# Patient Record
Sex: MALE | Race: WHITE | Employment: UNEMPLOYED | URBAN - METROPOLITAN AREA
[De-identification: names, ages, dates, MRNs, and addresses within clinical notes are randomized per-mention and may not be internally consistent; named-entity substitution may affect disease eponyms.]

---

## 2017-02-20 ENCOUNTER — OFFICE VISIT (OUTPATIENT)
Dept: FAMILY MEDICINE CLINIC | Facility: CLINIC | Age: 42
End: 2017-02-20

## 2017-02-20 VITALS
TEMPERATURE: 98 F | SYSTOLIC BLOOD PRESSURE: 154 MMHG | BODY MASS INDEX: 28.25 KG/M2 | DIASTOLIC BLOOD PRESSURE: 100 MMHG | WEIGHT: 180 LBS | HEIGHT: 67 IN | OXYGEN SATURATION: 98 % | RESPIRATION RATE: 18 BRPM | HEART RATE: 90 BPM

## 2017-02-20 DIAGNOSIS — H66.001 RIGHT ACUTE SUPPURATIVE OTITIS MEDIA: Primary | ICD-10-CM

## 2017-02-20 DIAGNOSIS — H60.311 ACUTE DIFFUSE OTITIS EXTERNA OF RIGHT EAR: ICD-10-CM

## 2017-02-20 PROCEDURE — 99213 OFFICE O/P EST LOW 20 MIN: CPT | Performed by: NURSE PRACTITIONER

## 2017-02-20 RX ORDER — AZITHROMYCIN 250 MG/1
TABLET, FILM COATED ORAL
Qty: 6 TABLET | Refills: 0 | Status: SHIPPED | OUTPATIENT
Start: 2017-02-20 | End: 2018-03-20 | Stop reason: ALTCHOICE

## 2017-02-20 RX ORDER — CIPROFLOXACIN AND DEXAMETHASONE 3; 1 MG/ML; MG/ML
4 SUSPENSION/ DROPS AURICULAR (OTIC) 2 TIMES DAILY
Qty: 1 BOTTLE | Refills: 0 | Status: SHIPPED | OUTPATIENT
Start: 2017-02-20 | End: 2018-03-20 | Stop reason: ALTCHOICE

## 2017-02-20 NOTE — PATIENT INSTRUCTIONS
Otitis Externa   · Don't use Q-tips. Keep ear dry. Wear cotton ball in ear during shower. No swimming or head submersion for 7 days and infection cleared. · Use antibiotic drops x 7 days. You should feel better in 2 days.  Use drops x 7 days or infecti 9. You may use over-the-counter medicine, such as acetaminophen or ibuprofen, to control pain and fever, unless something else was prescribed.  If you have chronic liver or kidney disease or have ever had a stomach ulcer or gastrointestinal bleeding, talk w 11. Use prescribed ear drops as directed. These help reduce swelling and fight the infection. If an ear wick was placed in the ear canal, apply drops right onto the end of the wick.  The wick will draw the medication into the ear canal even if it is swollen

## 2017-02-20 NOTE — PROGRESS NOTES
CHIEF COMPLAINT:   Patient presents with:  Ear Pain: Right ear pain, drainage, swollen glad x 1 month       HPI:   Kvng Mack is a 42year old male who presents to clinic today with complaints of right ear pain. Has had for 1 month.  Pt reports worse EARS: Bilateral hearing is intact to conversation, whisper, and finger rub.  right tragus mildly tender on palpation; left tragus non tender on palpation. right external auditory canal with erythema, purulent drainage, and mild swelling.   left external aud Sig: Place 4 drops into the right ear 2 (two) times daily. Risks, benefits, and side effects of medication explained and discussed. Risk and benefits of medication discussed.  Stressed importance of completing full course of antibiotic ear dudley 8. Finish all of the antibiotic medicine given, even though you may feel better after the first few days. 9. You may use over-the-counter medicine, such as acetaminophen or ibuprofen, to control pain and fever, unless something else was prescribed.  If you 10. Do not try to clean the ear canal. This can push pus and bacteria deeper into the canal.  11. Use prescribed ear drops as directed. These help reduce swelling and fight the infection.  If an ear wick was placed in the ear canal, apply drops right onto t © 8698-6163 [address], [address] [place]. All rights reserved. This information is not intended as a substitute for professional medical care. Always follow your healthcare professional's instructions.               P

## 2018-03-20 ENCOUNTER — APPOINTMENT (OUTPATIENT)
Dept: GENERAL RADIOLOGY | Facility: HOSPITAL | Age: 43
End: 2018-03-20
Attending: EMERGENCY MEDICINE
Payer: COMMERCIAL

## 2018-03-20 ENCOUNTER — OFFICE VISIT (OUTPATIENT)
Dept: INTERNAL MEDICINE CLINIC | Facility: CLINIC | Age: 43
End: 2018-03-20

## 2018-03-20 ENCOUNTER — HOSPITAL ENCOUNTER (EMERGENCY)
Facility: HOSPITAL | Age: 43
Discharge: HOME | End: 2018-03-20
Attending: EMERGENCY MEDICINE
Payer: COMMERCIAL

## 2018-03-20 ENCOUNTER — APPOINTMENT (OUTPATIENT)
Dept: CT IMAGING | Facility: HOSPITAL | Age: 43
End: 2018-03-20
Attending: EMERGENCY MEDICINE
Payer: COMMERCIAL

## 2018-03-20 VITALS
HEIGHT: 69 IN | WEIGHT: 191 LBS | BODY MASS INDEX: 28.29 KG/M2 | RESPIRATION RATE: 16 BRPM | HEART RATE: 93 BPM | TEMPERATURE: 98 F | SYSTOLIC BLOOD PRESSURE: 192 MMHG | DIASTOLIC BLOOD PRESSURE: 113 MMHG | OXYGEN SATURATION: 98 %

## 2018-03-20 VITALS
WEIGHT: 191 LBS | RESPIRATION RATE: 16 BRPM | HEART RATE: 79 BPM | HEIGHT: 67 IN | OXYGEN SATURATION: 98 % | SYSTOLIC BLOOD PRESSURE: 210 MMHG | TEMPERATURE: 99 F | BODY MASS INDEX: 29.98 KG/M2 | DIASTOLIC BLOOD PRESSURE: 120 MMHG

## 2018-03-20 DIAGNOSIS — I16.0 HYPERTENSIVE URGENCY: Primary | ICD-10-CM

## 2018-03-20 DIAGNOSIS — M54.2 CHRONIC NECK PAIN: ICD-10-CM

## 2018-03-20 DIAGNOSIS — E66.3 OVERWEIGHT (BMI 25.0-29.9): ICD-10-CM

## 2018-03-20 DIAGNOSIS — G89.29 CHRONIC NECK PAIN: ICD-10-CM

## 2018-03-20 DIAGNOSIS — I16.1 HYPERTENSIVE EMERGENCY: Primary | ICD-10-CM

## 2018-03-20 DIAGNOSIS — H53.9 VISUAL CHANGES: ICD-10-CM

## 2018-03-20 LAB
ALBUMIN SERPL-MCNC: 4.1 G/DL (ref 3.5–4.8)
ALP LIVER SERPL-CCNC: 40 U/L (ref 45–117)
ALT SERPL-CCNC: 30 U/L (ref 17–63)
AST SERPL-CCNC: 24 U/L (ref 15–41)
ATRIAL RATE: 83 BPM
BASOPHILS # BLD AUTO: 0.06 X10(3) UL (ref 0–0.1)
BASOPHILS NFR BLD AUTO: 0.7 %
BILIRUB SERPL-MCNC: 0.3 MG/DL (ref 0.1–2)
BUN BLD-MCNC: 18 MG/DL (ref 8–20)
CALCIUM BLD-MCNC: 9.1 MG/DL (ref 8.3–10.3)
CHLORIDE: 107 MMOL/L (ref 101–111)
CO2: 30 MMOL/L (ref 22–32)
CREAT BLD-MCNC: 1.06 MG/DL (ref 0.7–1.3)
EOSINOPHIL # BLD AUTO: 0.27 X10(3) UL (ref 0–0.3)
EOSINOPHIL NFR BLD AUTO: 3.2 %
ERYTHROCYTE [DISTWIDTH] IN BLOOD BY AUTOMATED COUNT: 12.8 % (ref 11.5–16)
GLUCOSE BLD-MCNC: 98 MG/DL (ref 70–99)
HCT VFR BLD AUTO: 43.1 % (ref 37–53)
HGB BLD-MCNC: 15.5 G/DL (ref 13–17)
IMMATURE GRANULOCYTE COUNT: 0.04 X10(3) UL (ref 0–1)
IMMATURE GRANULOCYTE RATIO %: 0.5 %
LYMPHOCYTES # BLD AUTO: 1.87 X10(3) UL (ref 0.9–4)
LYMPHOCYTES NFR BLD AUTO: 21.8 %
M PROTEIN MFR SERPL ELPH: 7.3 G/DL (ref 6.1–8.3)
MCH RBC QN AUTO: 31.3 PG (ref 27–33.2)
MCHC RBC AUTO-ENTMCNC: 36 G/DL (ref 31–37)
MCV RBC AUTO: 86.9 FL (ref 80–99)
MONOCYTES # BLD AUTO: 0.82 X10(3) UL (ref 0.1–1)
MONOCYTES NFR BLD AUTO: 9.6 %
NEUTROPHIL ABS PRELIM: 5.5 X10 (3) UL (ref 1.3–6.7)
NEUTROPHILS # BLD AUTO: 5.5 X10(3) UL (ref 1.3–6.7)
NEUTROPHILS NFR BLD AUTO: 64.2 %
P AXIS: 49 DEGREES
P-R INTERVAL: 144 MS
PLATELET # BLD AUTO: 217 10(3)UL (ref 150–450)
POTASSIUM SERPL-SCNC: 3.3 MMOL/L (ref 3.6–5.1)
Q-T INTERVAL: 372 MS
QRS DURATION: 92 MS
QTC CALCULATION (BEZET): 437 MS
R AXIS: -43 DEGREES
RBC # BLD AUTO: 4.96 X10(6)UL (ref 4.3–5.7)
RED CELL DISTRIBUTION WIDTH-SD: 40.2 FL (ref 35.1–46.3)
SODIUM SERPL-SCNC: 143 MMOL/L (ref 136–144)
T AXIS: 151 DEGREES
TROPONIN: <0.046 NG/ML (ref ?–0.05)
VENTRICULAR RATE: 83 BPM
WBC # BLD AUTO: 8.6 X10(3) UL (ref 4–13)

## 2018-03-20 PROCEDURE — 71045 X-RAY EXAM CHEST 1 VIEW: CPT | Performed by: EMERGENCY MEDICINE

## 2018-03-20 PROCEDURE — 85025 COMPLETE CBC W/AUTO DIFF WBC: CPT | Performed by: EMERGENCY MEDICINE

## 2018-03-20 PROCEDURE — 93005 ELECTROCARDIOGRAM TRACING: CPT

## 2018-03-20 PROCEDURE — 99214 OFFICE O/P EST MOD 30 MIN: CPT | Performed by: PHYSICIAN ASSISTANT

## 2018-03-20 PROCEDURE — 93010 ELECTROCARDIOGRAM REPORT: CPT

## 2018-03-20 PROCEDURE — 96374 THER/PROPH/DIAG INJ IV PUSH: CPT

## 2018-03-20 PROCEDURE — 80053 COMPREHEN METABOLIC PANEL: CPT | Performed by: EMERGENCY MEDICINE

## 2018-03-20 PROCEDURE — 99291 CRITICAL CARE FIRST HOUR: CPT

## 2018-03-20 PROCEDURE — 96375 TX/PRO/DX INJ NEW DRUG ADDON: CPT

## 2018-03-20 PROCEDURE — 99292 CRITICAL CARE ADDL 30 MIN: CPT

## 2018-03-20 PROCEDURE — 84484 ASSAY OF TROPONIN QUANT: CPT | Performed by: EMERGENCY MEDICINE

## 2018-03-20 PROCEDURE — 70450 CT HEAD/BRAIN W/O DYE: CPT | Performed by: EMERGENCY MEDICINE

## 2018-03-20 RX ORDER — HYDRALAZINE HYDROCHLORIDE 20 MG/ML
10 INJECTION INTRAMUSCULAR; INTRAVENOUS ONCE
Status: COMPLETED | OUTPATIENT
Start: 2018-03-20 | End: 2018-03-20

## 2018-03-20 RX ORDER — SODIUM NITROPRUSSIDE 25 MG/ML
3 INJECTION INTRAVENOUS ONCE
Status: COMPLETED | OUTPATIENT
Start: 2018-03-20 | End: 2018-03-20

## 2018-03-20 RX ORDER — LISINOPRIL 20 MG/1
20 TABLET ORAL DAILY
Status: DISCONTINUED | OUTPATIENT
Start: 2018-03-20 | End: 2018-03-20

## 2018-03-20 RX ORDER — HYDROCODONE BITARTRATE AND ACETAMINOPHEN 5; 325 MG/1; MG/1
1 TABLET ORAL EVERY 4 HOURS PRN
Status: DISCONTINUED | OUTPATIENT
Start: 2018-03-20 | End: 2018-03-20

## 2018-03-20 RX ORDER — HYDROCODONE BITARTRATE AND ACETAMINOPHEN 10; 325 MG/1; MG/1
1 TABLET ORAL EVERY 4 HOURS PRN
Status: DISCONTINUED | OUTPATIENT
Start: 2018-03-20 | End: 2018-03-20

## 2018-03-20 RX ORDER — ASPIRIN 81 MG/1
324 TABLET, CHEWABLE ORAL ONCE
Status: COMPLETED | OUTPATIENT
Start: 2018-03-20 | End: 2018-03-20

## 2018-03-20 RX ORDER — ACETAMINOPHEN 650 MG/1
650 SUPPOSITORY RECTAL EVERY 6 HOURS PRN
Status: DISCONTINUED | OUTPATIENT
Start: 2018-03-20 | End: 2018-03-20

## 2018-03-20 RX ORDER — LORAZEPAM 2 MG/ML
1 INJECTION INTRAMUSCULAR ONCE AS NEEDED
Status: DISCONTINUED | OUTPATIENT
Start: 2018-03-20 | End: 2018-03-20

## 2018-03-20 RX ORDER — METOCLOPRAMIDE HYDROCHLORIDE 5 MG/ML
10 INJECTION INTRAMUSCULAR; INTRAVENOUS ONCE
Status: COMPLETED | OUTPATIENT
Start: 2018-03-20 | End: 2018-03-20

## 2018-03-20 RX ORDER — KETOROLAC TROMETHAMINE 30 MG/ML
30 INJECTION, SOLUTION INTRAMUSCULAR; INTRAVENOUS ONCE
Status: COMPLETED | OUTPATIENT
Start: 2018-03-20 | End: 2018-03-20

## 2018-03-20 RX ORDER — DEXAMETHASONE SODIUM PHOSPHATE 4 MG/ML
10 VIAL (ML) INJECTION ONCE
Status: COMPLETED | OUTPATIENT
Start: 2018-03-20 | End: 2018-03-20

## 2018-03-20 RX ORDER — IBUPROFEN 200 MG
800 TABLET ORAL EVERY 8 HOURS PRN
Status: ON HOLD | COMMUNITY
End: 2018-03-26

## 2018-03-20 RX ORDER — MORPHINE SULFATE 4 MG/ML
2 INJECTION, SOLUTION INTRAMUSCULAR; INTRAVENOUS EVERY 2 HOUR PRN
Status: DISCONTINUED | OUTPATIENT
Start: 2018-03-20 | End: 2018-03-20

## 2018-03-20 RX ORDER — DIPHENHYDRAMINE HYDROCHLORIDE 50 MG/ML
25 INJECTION INTRAMUSCULAR; INTRAVENOUS ONCE
Status: COMPLETED | OUTPATIENT
Start: 2018-03-20 | End: 2018-03-20

## 2018-03-20 RX ORDER — MORPHINE SULFATE 4 MG/ML
1 INJECTION, SOLUTION INTRAMUSCULAR; INTRAVENOUS EVERY 2 HOUR PRN
Status: DISCONTINUED | OUTPATIENT
Start: 2018-03-20 | End: 2018-03-20

## 2018-03-20 NOTE — ED NOTES
Reviewed AMA form with patient. Patient signed without issue. Copy made and stapled to patient's discharge instructions. Patient verbalized that he will call immediately to make follow up appointment with primary care physician.

## 2018-03-20 NOTE — PATIENT INSTRUCTIONS
Proceed immediately to the emergency room at [facility].    Once discharged, please follow up with neurosurgery for your neck (Dr. Kota Simmons) and an ophthalmologist for your vision (Dr. Frank Lyn or one of his partners).

## 2018-03-20 NOTE — ED PROVIDER NOTES
Patient Seen in: [facility] Emergency Department    History   Patient presents with:   Eye Visual Problem (opthalmic)    Stated Complaint: vision changes x 5 days, HTN at MD office    HPI    54-year-old male comes the hospital she complained of havin ED Triage Vitals [03/20/18 1135]  BP: (!) 226/139  Pulse: 86  Resp: 11  Temp: n/a  Temp src: Temporal  SpO2: 99 %  O2 Device: None (Room air)    Current:BP (!) 190/110   Pulse 73   Resp 17   Ht 175.3 cm (5' 9\")   Wt 86.6 kg   SpO2 98%   BMI 28.21 kg/m² CT scanning is performed through the brain. Dose reduction techniques were used. Dose information is transmitted to the  [facility]) NRDR ([address]) which includes the Dose Index Registry.   PATIENT STATED HIST of infiltrates or congestive changes. There is pulmonary hypo inflation. Anterior cervical fusion. CONCLUSION:  Hypoinflated. No acute process or evidence of cardiac enlargement.      Dictated by: Speedy Paredes MD on 3/20/2018 at 11:59     Approved The patient arrived with a condition with significant morbidity and mortality associated.  The services I provided  were to promote improvement and reduce mortality specifically involving complex record review, complex medical decisions and interventions,

## 2018-03-20 NOTE — PROGRESS NOTES
Brandon Vázquez is a 36year old male. HPI:   Patient presents for R eye symptoms x 5 days. Awoke in the morning last week with crusting/drainage. Later in the day developed changes in vision. C/o seeing a \"curtain\" out of his R eye.   Can see abov or rashes  HEENT: per HPI  RESPIRATORY: denies SOB, DOMINGUEZ  CARDIOVASCULAR: per HPI  GI: denies abdominal pain, nausea, vomiting, diarrhea, constipation   NEURO: denies syncope, LOC  EXT: denies edema    EXAM:   BP (!) 210/120   Pulse 79   Temp 98.6 °F (37 °C

## 2018-03-20 NOTE — ED NOTES
Provided patient a card with room number and went to transport patient to CNICU. Patient refusing transport to floor at this time; he would like to sign out AMA. Explained rationale as to why patient was being admitted.  He reports \"my blood pressure is al

## 2018-03-20 NOTE — ED INITIAL ASSESSMENT (HPI)
Patient presents with vision changes to the right eye since Thursday of last week. He also reports a right sided headache. He feels like a fog on the lower half of his visual field of the right eye.  He went to his PCP today and they noted him to be hyperte

## 2018-03-20 NOTE — ED NOTES
Dr Efrain Nguyễn was informed that patient left AMA by CNICU. This RN spoke with Dr Efrain Nugyễn, reviewed ED course with him and confirmed patient had left AMA.

## 2018-03-20 NOTE — ED NOTES
DESTINY on duty spoke with patient. He still wishes to leave AMA, despite speaking with physician and being warned of concerns of extreme hypertension. Charge RN, Nursing Supervisor, and CNICU RN notified that patient has decided to leave AMA.  Patient receive

## 2018-03-20 NOTE — ED PROVIDER NOTES
Update note from Dr. Missael Dunn at 3:30 PM.  Patient is a 68-year-old male who presented to the ED for hypertension, headache, right eye visual change. He has remained hypertensive here. Labs normal, CT the brain demonstrated acute versus chronic stroke.   R

## 2018-03-20 NOTE — ED NOTES
Dr. Primo Cleary notified via [facility] that patient is leaving A

## 2018-03-22 ENCOUNTER — HOSPITAL ENCOUNTER (INPATIENT)
Facility: HOSPITAL | Age: 43
LOS: 4 days | Discharge: HOME | DRG: 065 | End: 2018-03-26
Attending: EMERGENCY MEDICINE | Admitting: HOSPITALIST
Payer: COMMERCIAL

## 2018-03-22 ENCOUNTER — HOSPITAL ENCOUNTER (OUTPATIENT)
Age: 43
Discharge: ANOTHER HOSPITAL | End: 2018-03-22
Attending: FAMILY MEDICINE
Payer: COMMERCIAL

## 2018-03-22 ENCOUNTER — APPOINTMENT (OUTPATIENT)
Dept: GENERAL RADIOLOGY | Facility: HOSPITAL | Age: 43
DRG: 065 | End: 2018-03-22
Attending: EMERGENCY MEDICINE
Payer: COMMERCIAL

## 2018-03-22 VITALS
OXYGEN SATURATION: 99 % | RESPIRATION RATE: 20 BRPM | TEMPERATURE: 98 F | DIASTOLIC BLOOD PRESSURE: 118 MMHG | SYSTOLIC BLOOD PRESSURE: 198 MMHG | HEART RATE: 78 BPM

## 2018-03-22 DIAGNOSIS — I16.1 HYPERTENSIVE EMERGENCY: Primary | ICD-10-CM

## 2018-03-22 DIAGNOSIS — I11.9 HYPERTENSIVE HEART DISEASE WITHOUT HEART FAILURE: ICD-10-CM

## 2018-03-22 DIAGNOSIS — I63.9 ACUTE CVA (CEREBROVASCULAR ACCIDENT) (HCC): ICD-10-CM

## 2018-03-22 DIAGNOSIS — H47.10 PAPILLEDEMA: ICD-10-CM

## 2018-03-22 DIAGNOSIS — H54.7 VISION LOSS: ICD-10-CM

## 2018-03-22 DIAGNOSIS — Z91.14 NONCOMPLIANCE WITH MEDICATIONS: ICD-10-CM

## 2018-03-22 PROBLEM — R79.89 AZOTEMIA: Status: ACTIVE | Noted: 2018-03-22

## 2018-03-22 PROBLEM — Z91.148 NONCOMPLIANCE WITH MEDICATIONS: Status: ACTIVE | Noted: 2018-03-22

## 2018-03-22 LAB
ALBUMIN SERPL-MCNC: 3.9 G/DL (ref 3.5–4.8)
ALP LIVER SERPL-CCNC: 38 U/L (ref 45–117)
ALT SERPL-CCNC: 51 U/L (ref 17–63)
APTT PPP: 26.9 SECONDS (ref 25–34)
AST SERPL-CCNC: 25 U/L (ref 15–41)
BASOPHILS # BLD AUTO: 0.09 X10(3) UL (ref 0–0.1)
BASOPHILS NFR BLD AUTO: 1 %
BILIRUB SERPL-MCNC: 0.2 MG/DL (ref 0.1–2)
BUN BLD-MCNC: 23 MG/DL (ref 8–20)
CALCIUM BLD-MCNC: 8.5 MG/DL (ref 8.3–10.3)
CHLORIDE: 110 MMOL/L (ref 101–111)
CHOLEST SMN-MCNC: 238 MG/DL (ref ?–200)
CO2: 27 MMOL/L (ref 22–32)
CREAT BLD-MCNC: 1.11 MG/DL (ref 0.7–1.3)
EOSINOPHIL # BLD AUTO: 0.19 X10(3) UL (ref 0–0.3)
EOSINOPHIL NFR BLD AUTO: 2.1 %
ERYTHROCYTE [DISTWIDTH] IN BLOOD BY AUTOMATED COUNT: 13.1 % (ref 11.5–16)
GLUCOSE BLD-MCNC: 81 MG/DL (ref 70–99)
HAV IGM SER QL: 2.2 MG/DL (ref 1.7–3)
HCT VFR BLD AUTO: 42.4 % (ref 37–53)
HDLC SERPL-MCNC: 50 MG/DL (ref 45–?)
HDLC SERPL: 4.76 {RATIO} (ref ?–4.97)
HGB BLD-MCNC: 14.3 G/DL (ref 13–17)
IMMATURE GRANULOCYTE COUNT: 0.04 X10(3) UL (ref 0–1)
IMMATURE GRANULOCYTE RATIO %: 0.5 %
INR BLD: 0.9 (ref 0.9–1.1)
LDLC SERPL CALC-MCNC: 133 MG/DL (ref ?–130)
LYMPHOCYTES # BLD AUTO: 2.06 X10(3) UL (ref 0.9–4)
LYMPHOCYTES NFR BLD AUTO: 23.3 %
M PROTEIN MFR SERPL ELPH: 7.1 G/DL (ref 6.1–8.3)
MCH RBC QN AUTO: 30.4 PG (ref 27–33.2)
MCHC RBC AUTO-ENTMCNC: 33.7 G/DL (ref 31–37)
MCV RBC AUTO: 90 FL (ref 80–99)
MONOCYTES # BLD AUTO: 0.92 X10(3) UL (ref 0.1–1)
MONOCYTES NFR BLD AUTO: 10.4 %
NEUTROPHIL ABS PRELIM: 5.54 X10 (3) UL (ref 1.3–6.7)
NEUTROPHILS # BLD AUTO: 5.54 X10(3) UL (ref 1.3–6.7)
NEUTROPHILS NFR BLD AUTO: 62.7 %
NONHDLC SERPL-MCNC: 188 MG/DL (ref ?–130)
PHOSPHATE SERPL-MCNC: 3.2 MG/DL (ref 2.5–4.9)
PLATELET # BLD AUTO: 219 10(3)UL (ref 150–450)
POTASSIUM SERPL-SCNC: 3.6 MMOL/L (ref 3.6–5.1)
PSA SERPL DL<=0.01 NG/ML-MCNC: 12.5 SECONDS (ref 12.4–14.7)
RBC # BLD AUTO: 4.71 X10(6)UL (ref 4.3–5.7)
RED CELL DISTRIBUTION WIDTH-SD: 42.8 FL (ref 35.1–46.3)
SODIUM SERPL-SCNC: 143 MMOL/L (ref 136–144)
TRIGL SERPL-MCNC: 275 MG/DL (ref ?–150)
TROPONIN: 0.09 NG/ML (ref ?–0.05)
VLDLC SERPL CALC-MCNC: 55 MG/DL (ref 5–40)
WBC # BLD AUTO: 8.8 X10(3) UL (ref 4–13)

## 2018-03-22 PROCEDURE — 99223 1ST HOSP IP/OBS HIGH 75: CPT | Performed by: HOSPITALIST

## 2018-03-22 PROCEDURE — 99205 OFFICE O/P NEW HI 60 MIN: CPT

## 2018-03-22 PROCEDURE — 99215 OFFICE O/P EST HI 40 MIN: CPT

## 2018-03-22 PROCEDURE — 71045 X-RAY EXAM CHEST 1 VIEW: CPT | Performed by: EMERGENCY MEDICINE

## 2018-03-22 RX ORDER — LORAZEPAM 2 MG/ML
1 INJECTION INTRAMUSCULAR ONCE
Status: COMPLETED | OUTPATIENT
Start: 2018-03-22 | End: 2018-03-23

## 2018-03-22 RX ORDER — FAMOTIDINE 20 MG/1
20 TABLET ORAL 2 TIMES DAILY
Status: DISCONTINUED | OUTPATIENT
Start: 2018-03-22 | End: 2018-03-26

## 2018-03-22 RX ORDER — METOCLOPRAMIDE HYDROCHLORIDE 5 MG/ML
10 INJECTION INTRAMUSCULAR; INTRAVENOUS EVERY 8 HOURS PRN
Status: DISCONTINUED | OUTPATIENT
Start: 2018-03-22 | End: 2018-03-26

## 2018-03-22 RX ORDER — MORPHINE SULFATE 4 MG/ML
2 INJECTION, SOLUTION INTRAMUSCULAR; INTRAVENOUS EVERY 2 HOUR PRN
Status: DISCONTINUED | OUTPATIENT
Start: 2018-03-22 | End: 2018-03-26

## 2018-03-22 RX ORDER — ENALAPRILAT 2.5 MG/2ML
1.25 INJECTION INTRAVENOUS ONCE
Status: COMPLETED | OUTPATIENT
Start: 2018-03-22 | End: 2018-03-22

## 2018-03-22 RX ORDER — BISACODYL 10 MG
10 SUPPOSITORY, RECTAL RECTAL
Status: DISCONTINUED | OUTPATIENT
Start: 2018-03-22 | End: 2018-03-26

## 2018-03-22 RX ORDER — METOPROLOL TARTRATE 5 MG/5ML
10 INJECTION INTRAVENOUS ONCE
Status: COMPLETED | OUTPATIENT
Start: 2018-03-22 | End: 2018-03-22

## 2018-03-22 RX ORDER — ATORVASTATIN CALCIUM 80 MG/1
80 TABLET, FILM COATED ORAL NIGHTLY
Status: DISCONTINUED | OUTPATIENT
Start: 2018-03-22 | End: 2018-03-26

## 2018-03-22 RX ORDER — HEPARIN SODIUM 5000 [USP'U]/ML
5000 INJECTION, SOLUTION INTRAVENOUS; SUBCUTANEOUS EVERY 12 HOURS SCHEDULED
Status: DISCONTINUED | OUTPATIENT
Start: 2018-03-22 | End: 2018-03-26

## 2018-03-22 RX ORDER — ASPIRIN 81 MG/1
324 TABLET, CHEWABLE ORAL ONCE
Status: COMPLETED | OUTPATIENT
Start: 2018-03-22 | End: 2018-03-22

## 2018-03-22 RX ORDER — ASPIRIN 81 MG/1
TABLET, CHEWABLE ORAL
Status: COMPLETED
Start: 2018-03-22 | End: 2018-03-22

## 2018-03-22 RX ORDER — SENNOSIDES 8.6 MG
17.2 TABLET ORAL NIGHTLY
Status: DISCONTINUED | OUTPATIENT
Start: 2018-03-22 | End: 2018-03-26

## 2018-03-22 RX ORDER — ACETAMINOPHEN 650 MG/1
650 SUPPOSITORY RECTAL EVERY 4 HOURS PRN
Status: DISCONTINUED | OUTPATIENT
Start: 2018-03-22 | End: 2018-03-26

## 2018-03-22 RX ORDER — LABETALOL HYDROCHLORIDE 5 MG/ML
10 INJECTION, SOLUTION INTRAVENOUS ONCE
Status: COMPLETED | OUTPATIENT
Start: 2018-03-22 | End: 2018-03-22

## 2018-03-22 RX ORDER — ASPIRIN 300 MG
300 SUPPOSITORY, RECTAL RECTAL DAILY
Status: DISCONTINUED | OUTPATIENT
Start: 2018-03-23 | End: 2018-03-26

## 2018-03-22 RX ORDER — SODIUM PHOSPHATE, DIBASIC AND SODIUM PHOSPHATE, MONOBASIC 7; 19 G/133ML; G/133ML
1 ENEMA RECTAL ONCE AS NEEDED
Status: DISCONTINUED | OUTPATIENT
Start: 2018-03-22 | End: 2018-03-26

## 2018-03-22 RX ORDER — ASPIRIN 325 MG
325 TABLET ORAL DAILY
Status: DISCONTINUED | OUTPATIENT
Start: 2018-03-23 | End: 2018-03-26

## 2018-03-22 RX ORDER — DOCUSATE SODIUM 100 MG/1
100 CAPSULE, LIQUID FILLED ORAL 2 TIMES DAILY
Status: DISCONTINUED | OUTPATIENT
Start: 2018-03-22 | End: 2018-03-26

## 2018-03-22 RX ORDER — LABETALOL HYDROCHLORIDE 5 MG/ML
10 INJECTION, SOLUTION INTRAVENOUS EVERY 10 MIN PRN
Status: DISCONTINUED | OUTPATIENT
Start: 2018-03-22 | End: 2018-03-26

## 2018-03-22 RX ORDER — MORPHINE SULFATE 4 MG/ML
1 INJECTION, SOLUTION INTRAMUSCULAR; INTRAVENOUS EVERY 2 HOUR PRN
Status: DISCONTINUED | OUTPATIENT
Start: 2018-03-22 | End: 2018-03-26

## 2018-03-22 RX ORDER — FAMOTIDINE 10 MG/ML
20 INJECTION, SOLUTION INTRAVENOUS 2 TIMES DAILY
Status: DISCONTINUED | OUTPATIENT
Start: 2018-03-22 | End: 2018-03-26

## 2018-03-22 RX ORDER — ONDANSETRON 2 MG/ML
4 INJECTION INTRAMUSCULAR; INTRAVENOUS EVERY 6 HOURS PRN
Status: DISCONTINUED | OUTPATIENT
Start: 2018-03-22 | End: 2018-03-26

## 2018-03-22 RX ORDER — PHENYLEPHRINE HCL IN 0.9% NACL 50MG/250ML
PLASTIC BAG, INJECTION (ML) INTRAVENOUS CONTINUOUS PRN
Status: DISCONTINUED | OUTPATIENT
Start: 2018-03-22 | End: 2018-03-26

## 2018-03-22 RX ORDER — SODIUM CHLORIDE 9 MG/ML
INJECTION, SOLUTION INTRAVENOUS CONTINUOUS
Status: ACTIVE | OUTPATIENT
Start: 2018-03-22 | End: 2018-03-24

## 2018-03-22 RX ORDER — ESMOLOL HYDROCHLORIDE 20 MG/ML
50 INJECTION, SOLUTION INTRAVENOUS CONTINUOUS
Status: DISCONTINUED | OUTPATIENT
Start: 2018-03-22 | End: 2018-03-22

## 2018-03-22 RX ORDER — ACETAMINOPHEN 325 MG/1
650 TABLET ORAL EVERY 4 HOURS PRN
Status: DISCONTINUED | OUTPATIENT
Start: 2018-03-22 | End: 2018-03-26

## 2018-03-22 RX ORDER — SODIUM NITROPRUSSIDE 25 MG/ML
3 INJECTION INTRAVENOUS ONCE
Status: DISCONTINUED | OUTPATIENT
Start: 2018-03-22 | End: 2018-03-22

## 2018-03-22 RX ORDER — POLYETHYLENE GLYCOL 3350 17 G/17G
17 POWDER, FOR SOLUTION ORAL DAILY PRN
Status: DISCONTINUED | OUTPATIENT
Start: 2018-03-22 | End: 2018-03-26

## 2018-03-22 RX ORDER — LABETALOL HYDROCHLORIDE 5 MG/ML
INJECTION, SOLUTION INTRAVENOUS
Status: DISPENSED
Start: 2018-03-22 | End: 2018-03-23

## 2018-03-22 NOTE — ED INITIAL ASSESSMENT (HPI)
PT has no vision in right eye, Pt was seen at 4 medical facilities today. First at [facility], Pt then referred to Opthamologist, who then ordered Pt to have echocardiogram. Pt states he has elevated pressure in right eye.  Pt then went to [facility]

## 2018-03-22 NOTE — ED INITIAL ASSESSMENT (HPI)
Pt presents to the immediate care due to blood pressure check. Pt states he was at the ophthalmologist who noted increased ocular pressure and wanted to send him for a blood pressure check. Pt has no vision in right eye.  Pt states he was seen in the ED a c

## 2018-03-22 NOTE — ED PROVIDER NOTES
Patient Seen in: [facility]    History   Patient presents with:  Blood Pressure    Stated Complaint: eye problem / loss of vision / possible elevated pressure    HPI    44-year-old male presents today for evaluation of elevated 98.3 °F (36.8 °C) [03/22/18 1350]  Temp src: Temporal [03/22/18 1350]  SpO2: 99 % [03/22/18 1350]  O2 Device: None (Room air) [03/22/18 1350]    Current:BP (!) 198/118   Pulse 78   Temp 98.3 °F (36.8 °C) (Temporal)   Resp 20   SpO2 99%         Physical Exa

## 2018-03-22 NOTE — ED PROVIDER NOTES
Patient Seen in: [facility] Emergency Department    History   Patient presents with:   Eye Visual Problem (opthalmic)  Hypertension (cardiovascular)    Stated Complaint: eye swelling    HPI    Right eye loss of vision- 8 days ago    Hx of htn- 200/13 172.7 cm (5' 8\")   Wt 86.6 kg   SpO2 98%   BMI 29.04 kg/m²         Physical Exam   Constitutional: He is oriented to person, place, and time. He appears well-developed and well-nourished. No distress. HENT:   Head: Atraumatic.    Right Ear: External ear Therapeutic Range is approximately 65- 104 seconds. The therapeutic range has been validated against 0.3-0.7 heparin anti-Xa units/mL. CBC WITH DIFFERENTIAL WITH PLATELET    Narrative:      The following orders were created for panel order CBC WITH DIFF his right eye about 8 days ago he still has light perception which is good but significant papilledema and thankfully his ophthalmologist got through with him today and he did return to the emergency department because he has EKG changes consistent with se

## 2018-03-23 ENCOUNTER — APPOINTMENT (OUTPATIENT)
Dept: MRI IMAGING | Facility: HOSPITAL | Age: 43
DRG: 065 | End: 2018-03-23
Attending: Other
Payer: COMMERCIAL

## 2018-03-23 ENCOUNTER — APPOINTMENT (OUTPATIENT)
Dept: CV DIAGNOSTICS | Facility: HOSPITAL | Age: 43
DRG: 065 | End: 2018-03-23
Attending: HOSPITALIST
Payer: COMMERCIAL

## 2018-03-23 PROBLEM — R79.89 TROPONIN LEVEL ELEVATED: Status: ACTIVE | Noted: 2018-03-23

## 2018-03-23 PROBLEM — R77.8 TROPONIN LEVEL ELEVATED: Status: ACTIVE | Noted: 2018-03-23

## 2018-03-23 LAB
ATRIAL RATE: 74 BPM
EST. AVERAGE GLUCOSE BLD GHB EST-MCNC: 100 MG/DL (ref 68–126)
GLUCOSE BLD-MCNC: 111 MG/DL (ref 65–99)
GLUCOSE BLD-MCNC: 94 MG/DL (ref 65–99)
HBA1C MFR BLD HPLC: 5.1 % (ref ?–5.7)
P AXIS: 52 DEGREES
P-R INTERVAL: 148 MS
POTASSIUM SERPL-SCNC: 3.5 MMOL/L (ref 3.6–5.1)
Q-T INTERVAL: 398 MS
QRS DURATION: 102 MS
QTC CALCULATION (BEZET): 441 MS
R AXIS: -42 DEGREES
T AXIS: 157 DEGREES
TROPONIN: 0.09 NG/ML (ref ?–0.05)
VENTRICULAR RATE: 74 BPM

## 2018-03-23 PROCEDURE — 70544 MR ANGIOGRAPHY HEAD W/O DYE: CPT | Performed by: NURSE PRACTITIONER

## 2018-03-23 PROCEDURE — 70549 MR ANGIOGRAPH NECK W/O&W/DYE: CPT | Performed by: NURSE PRACTITIONER

## 2018-03-23 PROCEDURE — 93306 TTE W/DOPPLER COMPLETE: CPT | Performed by: HOSPITALIST

## 2018-03-23 PROCEDURE — 99291 CRITICAL CARE FIRST HOUR: CPT | Performed by: OTHER

## 2018-03-23 PROCEDURE — 99233 SBSQ HOSP IP/OBS HIGH 50: CPT | Performed by: INTERNAL MEDICINE

## 2018-03-23 PROCEDURE — 70551 MRI BRAIN STEM W/O DYE: CPT | Performed by: NURSE PRACTITIONER

## 2018-03-23 RX ORDER — ECHINACEA PURPUREA EXTRACT 125 MG
1 TABLET ORAL
Status: DISCONTINUED | OUTPATIENT
Start: 2018-03-23 | End: 2018-03-26

## 2018-03-23 RX ORDER — POTASSIUM CHLORIDE 20 MEQ/1
40 TABLET, EXTENDED RELEASE ORAL EVERY 4 HOURS
Status: COMPLETED | OUTPATIENT
Start: 2018-03-23 | End: 2018-03-23

## 2018-03-23 RX ORDER — LABETALOL 100 MG/1
100 TABLET, FILM COATED ORAL EVERY 12 HOURS SCHEDULED
Status: DISCONTINUED | OUTPATIENT
Start: 2018-03-23 | End: 2018-03-24

## 2018-03-23 RX ORDER — ENALAPRILAT 2.5 MG/2ML
1.25 INJECTION INTRAVENOUS EVERY 4 HOURS PRN
Status: DISCONTINUED | OUTPATIENT
Start: 2018-03-23 | End: 2018-03-26

## 2018-03-23 RX ORDER — METOPROLOL TARTRATE 50 MG/1
50 TABLET, FILM COATED ORAL
Status: DISCONTINUED | OUTPATIENT
Start: 2018-03-23 | End: 2018-03-23

## 2018-03-23 RX ORDER — HYDROCHLOROTHIAZIDE 25 MG/1
25 TABLET ORAL DAILY
Status: DISCONTINUED | OUTPATIENT
Start: 2018-03-23 | End: 2018-03-26

## 2018-03-23 RX ORDER — LOSARTAN POTASSIUM 50 MG/1
50 TABLET ORAL 2 TIMES DAILY
Status: DISCONTINUED | OUTPATIENT
Start: 2018-03-23 | End: 2018-03-26

## 2018-03-23 NOTE — CONSULTS
[facility]  Neurocritical Care       Name: Donna Marshall  MRN: [id number]  Admission Date/Time: 3/22/2018  5:55 PM  Primary Care Provider:  Alonos Caldwell MD        Reason for Consultation:   Right eye central vision loss    HPI:   Jody Date Noted: 11/03/2015      Chronic cervical radiculopathy         Date Noted: 11/03/2015      Hypercholesterolemia         Date Noted: 11/03/2015      Mild intermittent asthma in adult without complication         Date Noted: 11/03/2015      Tobacco de influenza vaccine split quad (FLULAVAL) ages 6 months to 72 years inj 0.5ml 0.5 mL Intramuscular Prior to discharge   0.9%  NaCl infusion  Intravenous Continuous   acetaminophen (TYLENOL) tab 650 mg 650 mg Oral Q4H PRN   Or      acetaminophen (TYLENOL) 6 Denies dyspnea, cough, or sputum production  Cardiac:               Denies chest pain, palpitations or lower extremity edema. GI:                         Denies constipation, heartburn or melena.   :                       Denies dysuria or hematu information is transmitted to the [facility] ([facility]) NRDR ([address]) which includes the Dose Index Registry.   PATIENT STATED HISTORY: (As transcribed by Technologist)  Patient states he has vision changes to right e has elevated pressure in right eye. Pt then went to [facility] and was seen by Dr. Leonel Ellis, who advised PT to come to ER for further evaluation of hypertensive emergency and vision loss.  Pt sent for r/o stroke    FINDINGS:  Normal heart s 81   --    BUN  18  23*   --    CREATSERUM  1.06  1.11   --    GFRAA  99  94   --    GFRNAA  86  81   --    CA  9.1  8.5   --    ALB  4.1  3.9   --    NA  143  143   --    K  3.3*  3.6  3.5*   CL  107  110   --    CO2  30.0  27.0   --    ALKPHO  40*  38* and gave them an update. G: [place] no, [place] no    Goals of the Day: SBP < 150, mri brain. A total of 35 minutes of critical care time (exclusive of billable procedures) was administered to manage and/or prevent neurologic instability.  This inv

## 2018-03-23 NOTE — SLP NOTE
ADULT SWALLOWING EVALUATION    ASSESSMENT    ASSESSMENT/OVERALL IMPRESSION:  Patient seen for swallowing evaluation per protocol. Patient admitted with hypertensive emergency and vision loss. Patient denies any speech or language deficits.   He is employe denied  Imaging Results:   Brain CT from 3/20/18 revealed:  IMPRESSION:  Asymmetric low density in the right frontal lobe deep periventricular white matter is noted.  This is nonspecific.  This could be  due to a focal area of gliosis.  Chronic infarct is evaluation (pending MRI results)  Number of Visits to Meet Established Goals: 1  Follow Up Needed: Yes  SLP Follow-up Date: 03/26/18    Thank you for your referral.   If you have any questions, please contact Lopez Sanchez

## 2018-03-23 NOTE — PLAN OF CARE
Received pt. From ER at 2115. Neuro checks q2 hrs. Right eye field cut. Pt. Has blind spot in center of vision but can see peripherally out of eye. Both pupils are sluggish but reactive. Other aspects of exam intact.   Cardene gt as needed during night

## 2018-03-23 NOTE — PLAN OF CARE
Problem: CARDIOVASCULAR - ADULT  Goal: Maintains optimal cardiac output and hemodynamic stability  INTERVENTIONS:  - Monitor vital signs, rhythm, and trends  - Monitor for bleeding, hypotension and signs of decreased cardiac output  - Evaluate effectivenes pending imaging results. Pt has been out of bed and ambulating fine. PT/OT consulted.
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
## 2018-03-23 NOTE — PROGRESS NOTES
[facility]  Progress Note    John Becker     Subjective:  No chest pain or shortness of breath at rest. He has had de la cruz for months. He has not had cp with exertion.        Intake/Output:    Intake/Output Summary (Last 24 hours) at 03/23/18 179 [facility] Medications:  Losartan Potassium (COZAAR) tab 50 mg 50 mg Oral BID   Potassium Chloride ER (K-DUR M20) CR tab 40 mEq 40 mEq Oral Q4H   hydrochlorothiazide (HYDRODIURIL) tab 25 mg 25 mg Oral Daily   enalaprilat (VASOTEC) injection 1.25 mg 1.25 mg Intravenou 5000 UNIT/ML injection 5,000 Units 5,000 Units Subcutaneous 2 times per day       Assessment and Plan:  Principal Problem:    Hypertensive emergency  Active Problems:    cva: new and old?      Azotemia    Papilledema    Noncompliance with medications    Tro

## 2018-03-23 NOTE — H&P
[facility]  History and Physical     Dipak Pal Patient Status:  Inpatient    1975 MRN [id number]   [facility] 6NE-A Attending Nomi Chen MD   Hosp Day # 0 PCP Cindy Morocho MD     Chief Complaint: Loss of vision R e medications on file prior to encounter. Current Outpatient Prescriptions on File Prior to Encounter:  ibuprofen 200 MG Oral Tab Take 800 mg by mouth every 8 (eight) hours as needed for Pain.  Disp:  Rfl:        Review of Systems:   A comprehensive 14 poin TROP  <0.046  0.092*       Imaging: Imaging data reviewed in Epic. ASSESSMENT / PLAN:     1. HTN emergency, troponin 0.092  1. Cardiology  2. BP control per neurology parameters, currently improved without drip   3. Serial troponin, echo  2.  Painles

## 2018-03-23 NOTE — CONSULTS
[facility]    Report of Consultation    Wendy Human Patient Status:  Inpatient    1975 MRN [id number]   [facility] 6NE-A Attending Anthony Molina MD   [facility] Day # 1 PCP Jose Quiroz MD     Date of Admission:  3/22/2018  Da that he quit smoking about 14 months ago. He has a 24.00 pack-year smoking history. He has never used smokeless tobacco. He reports that he drinks alcohol. He reports that he does not use drugs.     Allergies:    Penicillins             Anaphylaxis  Shellfi Metoclopramide HCl (REGLAN) injection 10 mg, 10 mg, Intravenous, Q8H PRN  •  famoTIDine (PEPCID) tab 20 mg, 20 mg, Oral, BID **OR** famoTIDine (PEPCID) injection 20 mg, 20 mg, Intravenous, BID  •  Heparin Sodium (Porcine) 5000 UNIT/ML injection 5,000 Units of cervical spine     Cervical herniated disc     Chronic cervical radiculopathy     Hypercholesterolemia     Mild intermittent asthma in adult without complication     Tobacco dependence     Overweight (BMI 25.0-29. 9)     Hypertensive emergency     Azotem

## 2018-03-23 NOTE — PROGRESS NOTES
03/23/18 1735   Clinical Encounter Type   Visited With Patient and family together   Routine Visit Introduction   Continue Visiting No  (upon request or as needed)   Gnosticism Encounters   Spiritual Requests During Visit / Hospitalization [employer]
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
## 2018-03-23 NOTE — PROGRESS NOTES
[facility]  Neurocritical Care APRN Progress Note    NAME: Brandon Vázquez - ROOM: [facility]/3472-N - MRN: [id number] - Age: 36year old - :1975    History Of Present Illness:  Brandon Vázquez is a 36year old male with PMHx significa HPI.      OBJECTIVE  Vitals:  BP (!) 162/105   Pulse 68   Temp 98.4 °F (36.9 °C) (Temporal)   Resp 24   Ht 172.7 cm (5' 8\")   Wt 191 lb (86.6 kg)   SpO2 98%   BMI 29.04 kg/m²    Physical Exam:    General Appearance: Alert, cooperative, no distress, appear right eye, and right sided headache since last Thursday. FINDINGS: No evidence of hemorrhage or extra-axial fluid collection. There is asymmetric low density in the right frontal lobe deep periventricular white matter.   Brain parenchyma is otherwise un 0.2 03/22/2018   TP 7.1 03/22/2018   AST 25 03/22/2018   ALT 51 03/22/2018   PTT 26.9 03/22/2018   INR 0.90 03/22/2018       Assessment/Plan:  1. Hypertensive urgency  2. Acute vs subacute ischemic stroke  3.   Elevated troponin with EKG changes--c/f hype

## 2018-03-23 NOTE — PROGRESS NOTES
[facility]  Progress Note     Jaja Gage Patient Status:  Inpatient    1975 MRN [id number]   [facility] 6NE-A Attending Jerrod Wright MD   Hosp Day # 1 PCP Lisandro Rosen MD     Chief Complaint: R eye vision loss    S: Pa metoprolol tartrate  50 mg Oral 2x Daily(Beta Blocker)   • Losartan Potassium  50 mg Oral BID   • LORazepam  1 mg Intravenous Once   • atorvastatin  80 mg Oral Nightly   • aspirin  300 mg Rectal Daily    Or   • aspirin  325 mg Oral Daily   • Senna  17.2 mg

## 2018-03-24 LAB
ATRIAL RATE: 66 BPM
BASOPHILS # BLD AUTO: 0.04 X10(3) UL (ref 0–0.1)
BASOPHILS NFR BLD AUTO: 0.5 %
BUN BLD-MCNC: 22 MG/DL (ref 8–20)
CALCIUM BLD-MCNC: 8.8 MG/DL (ref 8.3–10.3)
CHLORIDE: 109 MMOL/L (ref 101–111)
CO2: 25 MMOL/L (ref 22–32)
CREAT BLD-MCNC: 1.03 MG/DL (ref 0.7–1.3)
EOSINOPHIL # BLD AUTO: 0.2 X10(3) UL (ref 0–0.3)
EOSINOPHIL NFR BLD AUTO: 2.4 %
ERYTHROCYTE [DISTWIDTH] IN BLOOD BY AUTOMATED COUNT: 13 % (ref 11.5–16)
GLUCOSE BLD-MCNC: 102 MG/DL (ref 70–99)
GLUCOSE BLD-MCNC: 108 MG/DL (ref 65–99)
GLUCOSE BLD-MCNC: 112 MG/DL (ref 65–99)
GLUCOSE BLD-MCNC: 114 MG/DL (ref 65–99)
GLUCOSE BLD-MCNC: 93 MG/DL (ref 65–99)
HAV IGM SER QL: 2.6 MG/DL (ref 1.7–3)
HCT VFR BLD AUTO: 45.3 % (ref 37–53)
HGB BLD-MCNC: 15.5 G/DL (ref 13–17)
IMMATURE GRANULOCYTE COUNT: 0.05 X10(3) UL (ref 0–1)
IMMATURE GRANULOCYTE RATIO %: 0.6 %
LYMPHOCYTES # BLD AUTO: 1.58 X10(3) UL (ref 0.9–4)
LYMPHOCYTES NFR BLD AUTO: 18.7 %
MCH RBC QN AUTO: 30.6 PG (ref 27–33.2)
MCHC RBC AUTO-ENTMCNC: 34.2 G/DL (ref 31–37)
MCV RBC AUTO: 89.3 FL (ref 80–99)
MONOCYTES # BLD AUTO: 0.74 X10(3) UL (ref 0.1–1)
MONOCYTES NFR BLD AUTO: 8.7 %
NEUTROPHIL ABS PRELIM: 5.85 X10 (3) UL (ref 1.3–6.7)
NEUTROPHILS # BLD AUTO: 5.85 X10(3) UL (ref 1.3–6.7)
NEUTROPHILS NFR BLD AUTO: 69.1 %
P AXIS: 52 DEGREES
P-R INTERVAL: 152 MS
PLATELET # BLD AUTO: 231 10(3)UL (ref 150–450)
POTASSIUM SERPL-SCNC: 3.8 MMOL/L (ref 3.6–5.1)
POTASSIUM SERPL-SCNC: 3.8 MMOL/L (ref 3.6–5.1)
Q-T INTERVAL: 426 MS
QRS DURATION: 82 MS
QTC CALCULATION (BEZET): 446 MS
R AXIS: -44 DEGREES
RBC # BLD AUTO: 5.07 X10(6)UL (ref 4.3–5.7)
RED CELL DISTRIBUTION WIDTH-SD: 42.5 FL (ref 35.1–46.3)
SED RATE-ML: 4 MM/HR (ref 0–12)
SODIUM SERPL-SCNC: 143 MMOL/L (ref 136–144)
T AXIS: 168 DEGREES
VENTRICULAR RATE: 66 BPM
WBC # BLD AUTO: 8.5 X10(3) UL (ref 4–13)

## 2018-03-24 PROCEDURE — 99232 SBSQ HOSP IP/OBS MODERATE 35: CPT | Performed by: INTERNAL MEDICINE

## 2018-03-24 PROCEDURE — 99232 SBSQ HOSP IP/OBS MODERATE 35: CPT | Performed by: OTHER

## 2018-03-24 RX ORDER — LABETALOL 200 MG/1
400 TABLET, FILM COATED ORAL EVERY 12 HOURS SCHEDULED
Status: DISCONTINUED | OUTPATIENT
Start: 2018-03-24 | End: 2018-03-26

## 2018-03-24 RX ORDER — LABETALOL 200 MG/1
200 TABLET, FILM COATED ORAL EVERY 12 HOURS SCHEDULED
Status: DISCONTINUED | OUTPATIENT
Start: 2018-03-24 | End: 2018-03-24

## 2018-03-24 RX ORDER — POTASSIUM CHLORIDE 20 MEQ/1
40 TABLET, EXTENDED RELEASE ORAL ONCE
Status: COMPLETED | OUTPATIENT
Start: 2018-03-24 | End: 2018-03-24

## 2018-03-24 NOTE — PROGRESS NOTES
03/24/18 1700 03/24/18 1701 03/24/18 1705   Vital Signs   BP (!) 159/107 (!) 170/99 (!) 146/106   BP Location Left arm Right arm Left arm   BP Method Automatic Automatic Automatic   Patient Position Sitting Sitting Sitting       03/24/18 1715 03/24/18 1

## 2018-03-24 NOTE — PROGRESS NOTES
[facility]  Neuro Critical Care Progress Note    Scharlene Human Patient Status:  Inpatient    1975 MRN [id number]   [facility] 6NE-A Attending Cyntha Leventhal, MD   [facility] Day # 2 PCP Jose Quiroz MD     Subjective:    Objective: acetaminophen (TYLENOL) tab 650 mg, 650 mg, Oral, Q4H PRN **OR** acetaminophen (TYLENOL) 650 MG rectal suppository 650 mg, 650 mg, Rectal, Q4H PRN  •  morphINE sulfate (PF) 4 MG/ML injection 1 mg, 1 mg, Intravenous, Q2H PRN **OR** morphINE sulfate (PF) 4 M 03/24/2018   MG 2.6 03/24/2018   PGLU 108 03/24/2018       Imaging:  MRI brain images reviewed personally      Assesment/Plan:   Principal Problem:    Hypertensive emergency  Active Problems:    Azotemia    Papilledema    Noncompliance with medications

## 2018-03-24 NOTE — PLAN OF CARE
Assumed patient care at 1300. Patient alert, oriented x4. c/o visual field cut in the right eye when looking straight ahead and to the left side. c/o headache. Pain meds administered as ordered. Up to chair and bathroom with assist x1, tolerated well.

## 2018-03-24 NOTE — PROGRESS NOTES
[facility]  Progress Note     Demar Power Patient Status:  Inpatient    1975 MRN [id number]   [facility] 6NE-A Attending Jessy Barrett MD   Hosp Day # 2 PCP Manuel Up MD     Chief Complaint: R eye vision loss    S: Pa data reviewed in Epic.     Medications:   • Losartan Potassium  50 mg Oral BID   • hydrochlorothiazide  25 mg Oral Daily   • Labetalol HCl  100 mg Oral 2 times per day   • atorvastatin  80 mg Oral Nightly   • aspirin  300 mg Rectal Daily    Or   • aspirin

## 2018-03-24 NOTE — SLP NOTE
SPEECH/LANGUAGE/COGNITIVE EVALUATION - INPATIENT    Admission Date: 3/22/2018  Evaluation Date: 03/24/18    Reason for Referral: Stroke protocol    ASSESSMENT & PLAN   ASSESSMENT & IMPRESSION  Pt seen this afternoon for a cognitive-communication assessment marked motion artifact. XR Chest on 03/22/2018:  CONCLUSION:  No active cardiopulmonary process identified.     Patient/Family Goals: None reported    Interdisciplinary Communication: Discussed with RN    Patient, family and/or caregiver has been informe

## 2018-03-24 NOTE — PLAN OF CARE
Pt. Follows commands, vitals stable, no complaints of pain, ambulating halls, pt. Can transfer to room 7617 report called to Carola Dillard R.N., all personal belongings with pt., will continue to monitor.

## 2018-03-24 NOTE — OCCUPATIONAL THERAPY NOTE
OCCUPATIONAL THERAPY QUICK EVALUATION - INPATIENT    Room Number: 3241/3214-P  Evaluation Date: 3/24/2018     Type of Evaluation: Quick Eval  Presenting Problem: infarct    Physician Order: IP Consult to Occupational Therapy  Reason for Therapy:  ADL/IADL Regularly Uses: Glasses    Prior Level of Function: Indep w adls    SUBJECTIVE   I'm fine except for my eye    Patient self-stated goal is to go home    OBJECTIVE  Precautions: None  Fall Risk: Standard fall risk    WEIGHT BEARING RESTRICTION  Weight Beari demonstrating a  0% degree impairment in activities of daily living. Research supports that patients with this level of impairment often benefit from home upon d/c.     In this OT evaluation patient presents with the following performance deficits: decrease

## 2018-03-24 NOTE — PROGRESS NOTES
[facility]  Progress Note    Izella Boas Patient Status:  Inpatient    1975 MRN [id number]   [facility] 6NE-A Attending Radha Motley MD   [facility] Day # 2 PCP Vivke Baeza MD     Subjective:  Izella Boas is a(n) 36 year ol 33.7  34.2   RDW  13.1  13.0   NEPRELIM  5.54  5.85   WBC  8.8  8.5   PLT  219.0  231.0     Recent Labs   Lab  03/22/18   1807  03/23/18 0417  03/24/18 0416   GLU  81   --   102*   BUN  23*   --   22*   CREATSERUM  1.11   --   1.03   GFRAA  94   --   1 artifact. Medications reviewed. Assessment and Plan:     1. Hypertensive emergency now under better control   2. long-standing untreated hypertension  3. Right eye visual loss with papilledema by history. Rule out secondary to his hypertension.   R

## 2018-03-24 NOTE — CONSULTS
Pt 36year old man with longstanding HTN. Stopped taking his meds for financial and insurance reasons and thought it would be no big deal. 10 days ago he lost vision in his right eye and it has not returned.  Pt also notes blurring of his left eye.pos HA glaucoma and he does not need any eye meds at the current time          Will have [facility] ophtho call him on Monday and sched a retina consult     75 minutes total time with pt with eval, dilation , consultation and discussion with staff          Pt cell [phone]

## 2018-03-24 NOTE — PLAN OF CARE
Received pt from CCU, oriented to [address], pt alert and oriented x 4, breathing unlabored on room air. Upon arrival, pt c/o headache, /102 right arm, 158/95 left arm, prn tylenol given for headache, prn IV enalapril given for BP.  Repeat /99 a

## 2018-03-24 NOTE — PROGRESS NOTES
[facility]  Progress Note    Lizz Holm Patient Status:  Inpatient    1975 MRN [id number]   [facility] 6NE-A Attending Enedelia Packer MD   Hosp Day # 2 PCP Kings Cartwright MD     Subjective:  Patient seen and examined.   Taya Vee Results  Component Value Date   INR 0.90 03/22/2018   INR 0.93 12/16/2015       Medications:    Current Facility-Administered Medications:  Labetalol HCl (NORMODYNE) tab 200 mg 200 mg Oral 2 times per day   Losartan Potassium (COZAAR) tab 50 mg 50 mg Oral (PEPCID) tab 20 mg 20 mg Oral BID   Or      famoTIDine (PEPCID) injection 20 mg 20 mg Intravenous BID   Heparin Sodium (Porcine) 5000 UNIT/ML injection 5,000 Units 5,000 Units Subcutaneous 2 times per day       Assessment and Plan:  Patient Active Problem

## 2018-03-24 NOTE — PROGRESS NOTES
Pt seen by Dr. Julian Graf who recommends OP retinal eval.     Pt's BP high on right arm than left arm, orthostatic + without any symptom. Dr. Barrera Lara made aware, received order to increase labetalol to 400mg BID, will carry out order.  Dr. Barrera Lara updated on

## 2018-03-24 NOTE — CM/SW NOTE
03/24/18 1500   CM/SW Screening   Referral [address] [facility] staff; Chart review   Patient's Mental Status Alert;Oriented   Patient's [address]   Patient lives with Spouse; Children   Patient Status Prior to Admission

## 2018-03-24 NOTE — PHYSICAL THERAPY NOTE
PHYSICAL THERAPY QUICK EVALUATION - INPATIENT    Room Number: 5294/9820-T  Evaluation Date: 3/24/2018  Presenting Problem: R eye vision loss  Physician Order: PT Eval and Treat    Problem List  Principal Problem:    Hypertensive emergency  Active Problem intact       AM-PAC '6-Clicks' INPATIENT SHORT FORM - BASIC MOBILITY  How much difficulty does the patient currently have. ..  -   Turning over in bed (including adjusting bedclothes, sheets and blankets)?: None   -   Sitting down on and standing up from a out all functional and mobility tasks safely despite visual loss. Based on this evaluation, patient's clinical presentation is stable and overall evaluation complexity is considered low.  Pt does not exhibit need for skilled PT and is safe to ambulate indep

## 2018-03-25 ENCOUNTER — APPOINTMENT (OUTPATIENT)
Dept: ULTRASOUND IMAGING | Facility: HOSPITAL | Age: 43
DRG: 065 | End: 2018-03-25
Attending: INTERNAL MEDICINE
Payer: COMMERCIAL

## 2018-03-25 ENCOUNTER — PRIOR ORIGINAL RECORDS (OUTPATIENT)
Dept: OTHER | Age: 43
End: 2018-03-25

## 2018-03-25 LAB
GLUCOSE BLD-MCNC: 102 MG/DL (ref 65–99)
GLUCOSE BLD-MCNC: 113 MG/DL (ref 65–99)
GLUCOSE BLD-MCNC: 115 MG/DL (ref 65–99)
POTASSIUM SERPL-SCNC: 4 MMOL/L (ref 3.6–5.1)

## 2018-03-25 PROCEDURE — 99232 SBSQ HOSP IP/OBS MODERATE 35: CPT | Performed by: INTERNAL MEDICINE

## 2018-03-25 PROCEDURE — 76775 US EXAM ABDO BACK WALL LIM: CPT | Performed by: INTERNAL MEDICINE

## 2018-03-25 PROCEDURE — 93975 VASCULAR STUDY: CPT | Performed by: INTERNAL MEDICINE

## 2018-03-25 RX ORDER — ALPRAZOLAM 0.25 MG/1
0.25 TABLET ORAL
Status: COMPLETED | OUTPATIENT
Start: 2018-03-26 | End: 2018-03-26

## 2018-03-25 RX ORDER — ATORVASTATIN CALCIUM 40 MG/1
40 TABLET, FILM COATED ORAL NIGHTLY
Qty: 30 TABLET | Refills: 1 | Status: SHIPPED | OUTPATIENT
Start: 2018-03-25 | End: 2018-03-26

## 2018-03-25 RX ORDER — ASPIRIN 325 MG
325 TABLET ORAL DAILY
Qty: 30 TABLET | Refills: 1 | Status: SHIPPED | OUTPATIENT
Start: 2018-03-25

## 2018-03-25 RX ORDER — METOPROLOL TARTRATE 50 MG/1
50 TABLET, FILM COATED ORAL ONCE
Status: DISCONTINUED | OUTPATIENT
Start: 2018-03-25 | End: 2018-03-25

## 2018-03-25 RX ORDER — METOPROLOL TARTRATE 50 MG/1
50 TABLET, FILM COATED ORAL ONCE AS NEEDED
Status: DISCONTINUED | OUTPATIENT
Start: 2018-03-25 | End: 2018-03-26 | Stop reason: HOSPADM

## 2018-03-25 RX ORDER — METOPROLOL TARTRATE 5 MG/5ML
5 INJECTION INTRAVENOUS SEE ADMIN INSTRUCTIONS
Status: DISCONTINUED | OUTPATIENT
Start: 2018-03-25 | End: 2018-03-26 | Stop reason: HOSPADM

## 2018-03-25 RX ORDER — METOPROLOL TARTRATE 100 MG/1
100 TABLET ORAL ONCE AS NEEDED
Status: DISCONTINUED | OUTPATIENT
Start: 2018-03-25 | End: 2018-03-26 | Stop reason: HOSPADM

## 2018-03-25 RX ORDER — ALPRAZOLAM 0.25 MG/1
0.25 TABLET ORAL
Status: DISCONTINUED | OUTPATIENT
Start: 2018-03-25 | End: 2018-03-25 | Stop reason: SDUPTHER

## 2018-03-25 RX ORDER — NITROGLYCERIN 0.4 MG/1
0.4 TABLET SUBLINGUAL ONCE
Status: DISCONTINUED | OUTPATIENT
Start: 2018-03-25 | End: 2018-03-25 | Stop reason: SDUPTHER

## 2018-03-25 RX ORDER — METOPROLOL TARTRATE 100 MG/1
100 TABLET ORAL ONCE
Status: DISCONTINUED | OUTPATIENT
Start: 2018-03-25 | End: 2018-03-25

## 2018-03-25 RX ORDER — NITROGLYCERIN 0.4 MG/1
0.4 TABLET SUBLINGUAL ONCE
Status: COMPLETED | OUTPATIENT
Start: 2018-03-26 | End: 2018-03-26

## 2018-03-25 RX ORDER — DILTIAZEM HYDROCHLORIDE 5 MG/ML
10 INJECTION INTRAVENOUS SEE ADMIN INSTRUCTIONS
Status: DISCONTINUED | OUTPATIENT
Start: 2018-03-25 | End: 2018-03-26 | Stop reason: HOSPADM

## 2018-03-25 NOTE — PROGRESS NOTES
[facility]  Progress Note     Rama Guajardo Patient Status:  Inpatient    1975 MRN [id number]   [facility] 6NE-A Attending Kristeen Hashimoto, MD   Hosp Day # 3 PCP Jamey Brooke MD     Chief Complaint: R eye vision loss    S: Pa INR  0.90       Recent Labs   Lab  03/22/18   1807  03/23/18   0417   TROP  0.092*  0.092*            Imaging: Imaging data reviewed in Epic.     Medications:   • Labetalol HCl  400 mg Oral 2 times per day   • Losartan Potassium  50 mg Oral BID   • hydroc

## 2018-03-25 NOTE — PLAN OF CARE
CARDIOVASCULAR - ADULT    • Maintains optimal cardiac output and hemodynamic stability Progressing        NEUROLOGICAL - ADULT    • Achieves stable or improved neurological status Progressing        Received patient awake and oriented with family at bedsid

## 2018-03-25 NOTE — PROGRESS NOTES
[facility] Progress Note    Subjective:  Still no vision in R eye.      Objective:  /76 (BP Location: Left arm)   Pulse 64   Temp 97.8 °F (36.6 °C) (Oral)   Resp 18   Ht 172.7 cm (5' 8\")   Wt 186 lb 11.7 oz (84.7 kg)   SpO2 99%   BMI 28.39 kg

## 2018-03-25 NOTE — PLAN OF CARE
Spoke with Dr Deanna Nava regarding lower bp's and CT protocol meds for tomorrow. Instead of 50 or 100mg of metoprolol tartrate tonight pt is to get 25 mg. Order placed and clarified with pharmacy.  RN inn am to call CT RN to discuss time of procedure for tomorrow

## 2018-03-26 ENCOUNTER — APPOINTMENT (OUTPATIENT)
Dept: CT IMAGING | Facility: HOSPITAL | Age: 43
DRG: 065 | End: 2018-03-26
Attending: CLINICAL NURSE SPECIALIST
Payer: COMMERCIAL

## 2018-03-26 VITALS
DIASTOLIC BLOOD PRESSURE: 80 MMHG | SYSTOLIC BLOOD PRESSURE: 131 MMHG | HEART RATE: 70 BPM | OXYGEN SATURATION: 97 % | WEIGHT: 186.75 LBS | TEMPERATURE: 98 F | HEIGHT: 68 IN | RESPIRATION RATE: 18 BRPM | BODY MASS INDEX: 28.3 KG/M2

## 2018-03-26 LAB
GLUCOSE BLD-MCNC: 102 MG/DL (ref 65–99)
GLUCOSE BLD-MCNC: 86 MG/DL (ref 65–99)

## 2018-03-26 PROCEDURE — 99239 HOSP IP/OBS DSCHRG MGMT >30: CPT | Performed by: STUDENT IN AN ORGANIZED HEALTH CARE EDUCATION/TRAINING PROGRAM

## 2018-03-26 PROCEDURE — 75574 CT ANGIO HRT W/3D IMAGE: CPT | Performed by: CLINICAL NURSE SPECIALIST

## 2018-03-26 RX ORDER — LABETALOL 200 MG/1
400 TABLET, FILM COATED ORAL 2 TIMES DAILY
Qty: 60 TABLET | Refills: 5 | Status: SHIPPED | OUTPATIENT
Start: 2018-03-26 | End: 2018-07-19

## 2018-03-26 RX ORDER — ATORVASTATIN CALCIUM 80 MG/1
80 TABLET, FILM COATED ORAL NIGHTLY
Qty: 30 TABLET | Refills: 5 | Status: SHIPPED | OUTPATIENT
Start: 2018-03-26 | End: 2018-10-17

## 2018-03-26 RX ORDER — NITROGLYCERIN 0.4 MG/1
TABLET SUBLINGUAL
Status: COMPLETED
Start: 2018-03-26 | End: 2018-03-26

## 2018-03-26 RX ORDER — LOSARTAN POTASSIUM 50 MG/1
50 TABLET ORAL 2 TIMES DAILY
Qty: 60 TABLET | Refills: 5 | Status: SHIPPED | OUTPATIENT
Start: 2018-03-26 | End: 2018-04-03 | Stop reason: DRUGHIGH

## 2018-03-26 NOTE — OCCUPATIONAL THERAPY NOTE
OCCUPATIONAL THERAPY EVALUATION - INPATIENT     Room Number: 7334/4048-R  Evaluation Date: 3/26/2018  Type of Evaluation: Re-evaluation  Presenting Problem: acute temporal lobe infarct    Physician Order: IP Consult to Occupational Therapy  Reason for Ther Family    Toilet and Equipment: Standard height toilet          Occupation/Status: FT-   Hand Dominance: Right  Drives: No  Patient Regularly Uses: Glasses    Prior Level of Function: independent with ADL, IADL. Pt is a  at [employer].   Pt report need…  -   Putting on and taking off regular lower body clothing?: None  -   Bathing (including washing, rinsing, drying)?: None  -   Toileting, which includes using toilet, bedpan or urinal? : None  -   Putting on and taking off regular upper body clothin instrumental activities of daily living, rest and sleep, work, leisure and social participation. Recommend OUTPATIENT OT for VISION PERCEPTION skills. Order placed. Therapist led pt to complete PHQ9, pt scored 0 on PHQ9.   Scores demonstrate:  0

## 2018-03-26 NOTE — PLAN OF CARE
CARDIOVASCULAR - ADULT    • Maintains optimal cardiac output and hemodynamic stability Progressing        NEUROLOGICAL - ADULT    • Achieves stable or improved neurological status Progressing        Received patient awake and oriented.  Medicated with Tylen

## 2018-03-26 NOTE — PROGRESS NOTES
[facility]  Progress Note    Ernesto Calderon Patient Status:  Inpatient    1975 MRN [id number]   [facility] 7NE-A Attending Ty Grey MD   Hosp Day # 4 PCP Jocelin Fontenot MD     Subjective:  Patient is anxious in the room.  State Dr. Sunny Mcdermott and Dr. Meena Hansen. Dicussed with Dr. Ludin Baig. CT with normal coronary arteries. Blood pressures controlled and maintaining NSR. Current regimen appears to be well tolerated. May be discharged from pure cardiac perspective.     CV medications:    L

## 2018-03-26 NOTE — PLAN OF CARE
Assumed care at 0730. Pt A&O x4. Strong bilaterally, sensation intact. R eye with no central vision, pt reports peripheral vision is intact. On room air. NSR on tele. Coronary artery CTA completed this am - see results. Pt anxious to be discharged.  Tylenol

## 2018-03-26 NOTE — DISCHARGE SUMMARY
[facility] [place] [facility]  DISCHARGE SUMMARY     Ahsan Hood Patient Status:  Inpatient    1975 MRN [id number]   [facility] 7NE-A Attending No att. providers found   [facility] Day # 4 PCP Sapna Oreilly MD     Date of Admission: 3/22/2018  Date o neurology and cardiology and his anti HTN  Regimen was adjusted. Due to his HTN, renal US was done and was negative for LIDA. Imaging of the brain did reveal however acute infarct to superior medial temporal lobe.   A CTA of coronaries was also completed- ne up with Dr. Walter Coombs, neurologist, in 3-4 weeks    Lorraine Bear MD  [address] [id number]    In 2 weeks      Sherita Loving, [address]  Ana Betts [address]  [phone]    Schedule an appo

## 2018-03-27 ENCOUNTER — PATIENT OUTREACH (OUTPATIENT)
Dept: CASE MANAGEMENT | Age: 43
End: 2018-03-27

## 2018-03-27 ENCOUNTER — TELEPHONE (OUTPATIENT)
Dept: INTERNAL MEDICINE CLINIC | Facility: CLINIC | Age: 43
End: 2018-03-27

## 2018-03-27 DIAGNOSIS — Z02.9 ENCOUNTERS FOR ADMINISTRATIVE PURPOSE: ICD-10-CM

## 2018-03-27 LAB
METANEPHRINE: 0.24 NMOL/L
NORMETANEPHRINE: 0.71 NMOL/L

## 2018-03-27 NOTE — TELEPHONE ENCOUNTER
Patient requesting an appointment to see Dr. Joanie Chapin only for a follow up visit, plus to discuss certain medications he has been experiencing issues with. Offered other physicians in the group, pt declined.

## 2018-03-27 NOTE — PROGRESS NOTES
Initial Post Discharge Follow Up   Discharge Date: 3/26/18  Contact Date: 3/27/2018    Consent Verification:  Assessment Completed With: Patient  HIPAA Verified?   Yes    Discharge Dx:  Hypertensive emergency, CVA    General:   • How have you been since you have any questions about your new medication? No  • Did you  your discharge medications when you left the hospital? Yes  • May I go over your medications with you to make sure we are not missing anything? yes  • Are there any reasons that keep yo call to schedule HFU appts with cardio and neuro this week. Is there any reason as to why you cannot make your appointments? No     NCM Reviewed upcoming Specialist Appt with patient     Yes     Overall Rating:    How would you rate the care you recei

## 2018-03-28 LAB
CATECHOL NOREPINEPHRINE: 720 PG/ML
DOPAMINE: <20 PG/ML
EPINEPHRINE: <10 PG/ML

## 2018-03-28 NOTE — PAYOR COMM NOTE
--------------  CONTINUED STAY REVIEW    Payor: Sydney KELSEY/MEI  Subscriber #:  [id number]  Authorization Number: [id number]    Admit date: 3/22/18  Admit time: 2105    Admitting Physician: Abelino Betts MD  Attending Physician:  Chiquis attMercedes melgoza ECG, echo LVEF 92-80%, diastolic dysfunction, mild hypokinesis of the basal-mid inferior and inferolateral myocardium.    Plan:   · LIDA US pending  · CTA of coronary arteries tomorrow  · Follow bp on current medications  Lucia Blank MD  3/25/2018  1:57 PM

## 2018-03-28 NOTE — PAYOR COMM NOTE
--------------  DISCHARGE REVIEW    Payor: Gume KELSEY/MEI  Subscriber #:  [id number]  Authorization Number: [id number]    Admit date: 3/22/18  Admit time:  2105  Discharge Date: 3/26/2018  4:38 PM     Admitting Physician: Raaf Selby MD  Attending P with loss of vision. Patient reports he has had visual changes for the past week. Started as floating black spots in his lower visual field, now involves most of his central visual field. Can see lights and periphery on his R side only.  This has been assoc (two) times daily.    Quantity:  60 tablet  Refills:  5        STOP taking these medications    ibuprofen 200 MG Tabs  Commonly known as:  MOTRIN              Where to Get Your Medications      Please  your prescriptions at the location directed by aliya

## 2018-03-28 NOTE — PAYOR COMM NOTE
--------------  CONTINUED STAY REVIEW    Payor: Patricia KELSEY/MEI  Subscriber #:  [id number]  Authorization Number: [id number]    Admit date: 3/22/18  Admit time: 2105    Admitting Physician: Cheryl Maynard MD  Attending Physician:  No attMercedes melgoza as outlined by the cardiology service. Close clinical follow-up. We will follow. Odilia Langley SR.  3/23/2018  6:31 AM  Neurocritical Care APRN Progress Note  Assessment/Plan:  1. Hypertensive urgency  2. Acute vs subacute ischemic stroke  3.   El under better control. Would suggest an ophthalmology evaluation prior to any consideration of discharge to assure that his papilledema is responding and that his intraocular pressures are not elevated. We will plan to sign off at this time.   Happy to see PM    03/24/18 1700 03/24/18 1701 03/24/18 1705    Vital Signs   BP (!) 159/107 (!) 170/99 (!) 146/106   BP Location Left arm Right arm Left arm   BP Method Automatic Automatic Automatic   Patient Position Sitting Sitting Sitting        03/24/18 1715 03/24 sodium (COLACE) cap 100 mg 100 mg Oral BID   PEG 3350 (MIRALAX) powder packet 17 g 17 g Oral Daily PRN   ondansetron HCl (ZOFRAN) injection 4 mg 4 mg Intravenous Q6H PRN   Or         Metoclopramide HCl (REGLAN) injection 10 mg 10 mg Intravenous Q8H PRN   f

## 2018-04-03 ENCOUNTER — OFFICE VISIT (OUTPATIENT)
Dept: INTERNAL MEDICINE CLINIC | Facility: CLINIC | Age: 43
End: 2018-04-03

## 2018-04-03 ENCOUNTER — PRIOR ORIGINAL RECORDS (OUTPATIENT)
Dept: OTHER | Age: 43
End: 2018-04-03

## 2018-04-03 VITALS
RESPIRATION RATE: 16 BRPM | TEMPERATURE: 98 F | DIASTOLIC BLOOD PRESSURE: 115 MMHG | OXYGEN SATURATION: 98 % | WEIGHT: 200 LBS | HEART RATE: 65 BPM | BODY MASS INDEX: 31.39 KG/M2 | HEIGHT: 67 IN | SYSTOLIC BLOOD PRESSURE: 177 MMHG

## 2018-04-03 DIAGNOSIS — I16.1 HYPERTENSIVE EMERGENCY: Primary | ICD-10-CM

## 2018-04-03 DIAGNOSIS — H53.40 VISUAL FIELD DEFECTS: ICD-10-CM

## 2018-04-03 DIAGNOSIS — I63.9 ACUTE CVA (CEREBROVASCULAR ACCIDENT) (HCC): ICD-10-CM

## 2018-04-03 DIAGNOSIS — H47.10 OPTIC NERVE EDEMA: ICD-10-CM

## 2018-04-03 DIAGNOSIS — Z98.1 S/P CERVICAL SPINAL FUSION: ICD-10-CM

## 2018-04-03 DIAGNOSIS — M54.2 CHRONIC NECK PAIN: ICD-10-CM

## 2018-04-03 DIAGNOSIS — G89.29 CHRONIC NECK PAIN: ICD-10-CM

## 2018-04-03 DIAGNOSIS — H35.033 HYPERTENSIVE RETINOPATHY OF BOTH EYES: ICD-10-CM

## 2018-04-03 PROBLEM — R79.89 TROPONIN LEVEL ELEVATED: Status: RESOLVED | Noted: 2018-03-23 | Resolved: 2018-04-03

## 2018-04-03 PROBLEM — Z91.148 NONCOMPLIANCE WITH MEDICATIONS: Status: RESOLVED | Noted: 2018-03-22 | Resolved: 2018-04-03

## 2018-04-03 PROBLEM — R79.89 AZOTEMIA: Status: RESOLVED | Noted: 2018-03-22 | Resolved: 2018-04-03

## 2018-04-03 PROBLEM — E66.9 OBESITY (BMI 30-39.9): Status: ACTIVE | Noted: 2018-04-03

## 2018-04-03 PROBLEM — E66.3 OVERWEIGHT (BMI 25.0-29.9): Status: RESOLVED | Noted: 2018-03-20 | Resolved: 2018-04-03

## 2018-04-03 PROBLEM — R77.8 TROPONIN LEVEL ELEVATED: Status: RESOLVED | Noted: 2018-03-23 | Resolved: 2018-04-03

## 2018-04-03 PROBLEM — Z91.14 NONCOMPLIANCE WITH MEDICATIONS: Status: RESOLVED | Noted: 2018-03-22 | Resolved: 2018-04-03

## 2018-04-03 PROBLEM — I11.9 HYPERTENSIVE HEART DISEASE WITHOUT HEART FAILURE: Status: RESOLVED | Noted: 2018-03-22 | Resolved: 2018-04-03

## 2018-04-03 PROCEDURE — 99495 TRANSJ CARE MGMT MOD F2F 14D: CPT | Performed by: INTERNAL MEDICINE

## 2018-04-03 PROCEDURE — 1111F DSCHRG MED/CURRENT MED MERGE: CPT | Performed by: INTERNAL MEDICINE

## 2018-04-03 RX ORDER — CLONIDINE HYDROCHLORIDE 0.1 MG/1
0.1 TABLET ORAL 2 TIMES DAILY
Refills: 0 | COMMUNITY
Start: 2018-04-02 | End: 2018-04-03 | Stop reason: DRUGHIGH

## 2018-04-03 RX ORDER — CLONIDINE HYDROCHLORIDE 0.2 MG/1
0.2 TABLET ORAL 2 TIMES DAILY
Qty: 60 TABLET | Refills: 5 | Status: ON HOLD | OUTPATIENT
Start: 2018-04-03 | End: 2018-04-11

## 2018-04-03 RX ORDER — LOSARTAN POTASSIUM AND HYDROCHLOROTHIAZIDE 25; 100 MG/1; MG/1
1 TABLET ORAL DAILY
Qty: 30 TABLET | Refills: 5 | Status: SHIPPED | OUTPATIENT
Start: 2018-04-03 | End: 2018-10-12

## 2018-04-03 NOTE — PROGRESS NOTES
HPI:    Laura Bennett is a 36year old male here today for hospital follow up.    He was discharged from Inpatient hospital, [facility] to Home   Admission Date: 3/22/18   Discharge Date: 3/26/18  Hospital Discharge Diagnoses (since 3/4/2018)     CVA for a 2nd opinion w/ Dr. Jayden Rangel from [facility]. Only time will tell if he recovers. Allergies:  He is allergic to penicillins and shellfish-derived products.     Current Meds:    Current Outpatient Prescriptions on File Prior to Visit:  Goldie Blancas kg/m² as calculated from the following:    Height as of this encounter: 67\". Weight as of this encounter: 200 lb.    BP (!) 177/115   Pulse 65   Temp 98.2 °F (36.8 °C) (Oral)   Resp 16   Ht 67\"   Wt 200 lb   SpO2 98%   BMI 31.32 kg/m²    GENERAL: well secondary prevention measures. 3. Hypertensive retinopathy w/ optic nerve head edema OU and visual field defects - He saw Dr. Lucy Levine from [facility] yesterday and he went for a 2nd opinion w/ Dr. Gabrielle Gonzales from [facility] today.   Only time will tell if he

## 2018-04-04 ENCOUNTER — OFFICE VISIT (OUTPATIENT)
Dept: SURGERY | Facility: CLINIC | Age: 43
End: 2018-04-04

## 2018-04-04 VITALS
DIASTOLIC BLOOD PRESSURE: 92 MMHG | HEART RATE: 72 BPM | WEIGHT: 200 LBS | SYSTOLIC BLOOD PRESSURE: 160 MMHG | BODY MASS INDEX: 31 KG/M2

## 2018-04-04 DIAGNOSIS — M54.2 CHRONIC NECK PAIN: ICD-10-CM

## 2018-04-04 DIAGNOSIS — I63.9 ACUTE CVA (CEREBROVASCULAR ACCIDENT) (HCC): Primary | ICD-10-CM

## 2018-04-04 DIAGNOSIS — G89.29 CHRONIC NECK PAIN: ICD-10-CM

## 2018-04-04 DIAGNOSIS — Z98.1 S/P CERVICAL SPINAL FUSION: ICD-10-CM

## 2018-04-04 PROCEDURE — 99204 OFFICE O/P NEW MOD 45 MIN: CPT | Performed by: ANESTHESIOLOGY

## 2018-04-04 NOTE — H&P
Name: Marcus Saunders   : 1975   DOS: 2018     Chief complaint: Neck pain    History of present illness:  Marcus Saunders is a 36year old right-handed male with a history of chronic neck pain since .   He was evaluated Dr Lew Green and treated Disp: 30 tablet Rfl: 1     Past Surgical History:  No date: BACK SURGERY      Comment: C4-7 ACDF 12/22/15  2005: HERNIA SURGERY Bilateral      Comment: [facility]  JAW SURGERY: OTHER  1996: OTHER SURGICAL HISTORY      Comment: Jaw surgery secondary lower back:     Motor Examination:   (R)   (L)   Hip Abduction:               5    5   Hip Flexion:    5    5   Knee Extension:   5    5   Knee Flexion:    5    5   Ant.  Tibialis:    5    5  Extensor Hallucis Longus:   5    5  Peroneals:     5    5  Gastro hypertension. This is far more important than his stable neck pain.   Because of this, I do not believe any cervical medial branch or epidural injections are warranted until he has been seen and cleared by a neurologist.  In the interim, we discussed vianey

## 2018-04-04 NOTE — PROGRESS NOTES
HPI:    Patient ID: Faustine Buerger is a 36year old male. HPI    Review of Systems         Current Outpatient Prescriptions:  Losartan Potassium-HCTZ 100-25 MG Oral Tab Take 1 tablet by mouth daily.  Disp: 30 tablet Rfl: 5   CloNIDine HCl 0.2 MG Oral Ta

## 2018-04-04 NOTE — PATIENT INSTRUCTIONS
Refill policies:    • Allow 2-3 business days for refills; controlled substances may take longer.   • Contact your pharmacy at least 5 days prior to running out of medication and have them send an electronic request or submit request through the [facility] for the entire amount billed. Precertification and Prior Authorizations  If your physician has recommended that you have a procedure or additional testing performed.   [employer] ([employer]OL[employer]) will contact your insurance carrier to obtain pr

## 2018-04-05 ENCOUNTER — TELEPHONE (OUTPATIENT)
Dept: SURGERY | Facility: CLINIC | Age: 43
End: 2018-04-05

## 2018-04-05 NOTE — TELEPHONE ENCOUNTER
Spoke with patient and scheduled TPI cervical paravertebral and trapezius muscles. Reviewed pre-op instructions. Patient verbalized understanding, no further questions at this time. Per Dr. Ramu Zarate, pt does not need to hold his Asprin 325 mg.

## 2018-04-07 ENCOUNTER — HOSPITAL ENCOUNTER (INPATIENT)
Facility: HOSPITAL | Age: 43
LOS: 4 days | Discharge: HOME | DRG: 093 | End: 2018-04-11
Attending: EMERGENCY MEDICINE | Admitting: HOSPITALIST
Payer: COMMERCIAL

## 2018-04-07 ENCOUNTER — PRIOR ORIGINAL RECORDS (OUTPATIENT)
Dept: OTHER | Age: 43
End: 2018-04-07

## 2018-04-07 ENCOUNTER — APPOINTMENT (OUTPATIENT)
Dept: MRI IMAGING | Facility: HOSPITAL | Age: 43
DRG: 093 | End: 2018-04-07
Attending: EMERGENCY MEDICINE
Payer: COMMERCIAL

## 2018-04-07 DIAGNOSIS — H54.7 VISION LOSS: Primary | ICD-10-CM

## 2018-04-07 PROBLEM — R73.9 HYPERGLYCEMIA: Status: ACTIVE | Noted: 2018-04-07

## 2018-04-07 PROBLEM — E87.6 HYPOKALEMIA: Status: ACTIVE | Noted: 2018-04-07

## 2018-04-07 PROBLEM — R79.89 AZOTEMIA: Status: ACTIVE | Noted: 2018-04-07

## 2018-04-07 PROCEDURE — 70553 MRI BRAIN STEM W/O & W/DYE: CPT | Performed by: EMERGENCY MEDICINE

## 2018-04-07 PROCEDURE — 99223 1ST HOSP IP/OBS HIGH 75: CPT | Performed by: HOSPITALIST

## 2018-04-07 PROCEDURE — 70543 MRI ORBT/FAC/NCK W/O &W/DYE: CPT | Performed by: EMERGENCY MEDICINE

## 2018-04-07 RX ORDER — CLONIDINE HYDROCHLORIDE 0.2 MG/1
0.2 TABLET ORAL 2 TIMES DAILY
Status: DISCONTINUED | OUTPATIENT
Start: 2018-04-07 | End: 2018-04-11

## 2018-04-07 RX ORDER — ACETAMINOPHEN 325 MG/1
650 TABLET ORAL EVERY 6 HOURS PRN
Status: DISCONTINUED | OUTPATIENT
Start: 2018-04-07 | End: 2018-04-11

## 2018-04-07 RX ORDER — SODIUM CHLORIDE 9 MG/ML
INJECTION, SOLUTION INTRAVENOUS CONTINUOUS
Status: ACTIVE | OUTPATIENT
Start: 2018-04-07 | End: 2018-04-07

## 2018-04-07 RX ORDER — SODIUM CHLORIDE 9 MG/ML
1000 INJECTION, SOLUTION INTRAVENOUS ONCE
Status: COMPLETED | OUTPATIENT
Start: 2018-04-07 | End: 2018-04-07

## 2018-04-07 RX ORDER — ATORVASTATIN CALCIUM 80 MG/1
80 TABLET, FILM COATED ORAL NIGHTLY
Status: DISCONTINUED | OUTPATIENT
Start: 2018-04-07 | End: 2018-04-11

## 2018-04-07 RX ORDER — LABETALOL 200 MG/1
400 TABLET, FILM COATED ORAL 2 TIMES DAILY
Status: DISCONTINUED | OUTPATIENT
Start: 2018-04-07 | End: 2018-04-11

## 2018-04-07 RX ORDER — PANTOPRAZOLE SODIUM 40 MG/1
40 TABLET, DELAYED RELEASE ORAL
Status: DISCONTINUED | OUTPATIENT
Start: 2018-04-07 | End: 2018-04-11

## 2018-04-07 RX ORDER — CETIRIZINE HYDROCHLORIDE 10 MG/1
10 TABLET ORAL DAILY
Status: DISCONTINUED | OUTPATIENT
Start: 2018-04-07 | End: 2018-04-11

## 2018-04-07 RX ORDER — POTASSIUM CHLORIDE 20 MEQ/1
40 TABLET, EXTENDED RELEASE ORAL EVERY 4 HOURS
Status: COMPLETED | OUTPATIENT
Start: 2018-04-07 | End: 2018-04-08

## 2018-04-07 RX ORDER — TRAZODONE HYDROCHLORIDE 50 MG/1
50 TABLET ORAL NIGHTLY PRN
Status: DISCONTINUED | OUTPATIENT
Start: 2018-04-07 | End: 2018-04-11

## 2018-04-07 RX ORDER — ONDANSETRON 2 MG/ML
4 INJECTION INTRAMUSCULAR; INTRAVENOUS EVERY 4 HOURS PRN
Status: DISCONTINUED | OUTPATIENT
Start: 2018-04-07 | End: 2018-04-07

## 2018-04-07 RX ORDER — LORAZEPAM 2 MG/ML
1 INJECTION INTRAMUSCULAR ONCE
Status: COMPLETED | OUTPATIENT
Start: 2018-04-07 | End: 2018-04-07

## 2018-04-07 RX ORDER — ASPIRIN 325 MG
325 TABLET ORAL DAILY
Status: DISCONTINUED | OUTPATIENT
Start: 2018-04-08 | End: 2018-04-11

## 2018-04-07 RX ORDER — ONDANSETRON 2 MG/ML
8 INJECTION INTRAMUSCULAR; INTRAVENOUS EVERY 6 HOURS PRN
Status: DISCONTINUED | OUTPATIENT
Start: 2018-04-07 | End: 2018-04-11

## 2018-04-07 NOTE — H&P
[facility]  History and Physical     Nolon Goes Patient Status:  Emergency    1975 MRN [id number]   Location [address] Attending Natalya Brasher MD   Hosp Day # 0 PCP Corin Lara MD     Chief Complaint: (0.2 mg total) by mouth 2 (two) times daily. Disp: 60 tablet Rfl: 5   atorvastatin 80 MG Oral Tab Take 1 tablet (80 mg total) by mouth nightly.  Disp: 30 tablet Rfl: 5   Labetalol HCl 200 MG Oral Tab Take 2 tablets (400 mg total) by mouth 2 (two) times ernesto Imaging data reviewed in Epic. ASSESSMENT / PLAN:     1. Vision loss, headache, papilledema-doubt this explained soley by HTN. MRI repeat pending; will likely need LP as well.   Suspect either pseudotumor vs MS vs CSF drainage issue; possible need for

## 2018-04-07 NOTE — ED INITIAL ASSESSMENT (HPI)
Pt states has had progressive vision loss x one month. Saw his opthamologist today and sent to ER for further testing.

## 2018-04-07 NOTE — ED NOTES
Pt reports he was told by his opthamologist that his optic nerve is swollen - also reports was recently here for a stroke and had hypertension which he was told was what was causing his vision changes

## 2018-04-08 PROCEDURE — 99255 IP/OBS CONSLTJ NEW/EST HI 80: CPT | Performed by: OTHER

## 2018-04-08 PROCEDURE — 99232 SBSQ HOSP IP/OBS MODERATE 35: CPT | Performed by: HOSPITALIST

## 2018-04-08 RX ORDER — DIPHENHYDRAMINE HCL 50 MG
50 CAPSULE ORAL NIGHTLY PRN
Status: DISCONTINUED | OUTPATIENT
Start: 2018-04-08 | End: 2018-04-11

## 2018-04-08 RX ORDER — POTASSIUM CHLORIDE 20 MEQ/1
40 TABLET, EXTENDED RELEASE ORAL ONCE
Status: COMPLETED | OUTPATIENT
Start: 2018-04-08 | End: 2018-04-08

## 2018-04-08 NOTE — PROGRESS NOTES
[facility]  Progress note     Angelo Tapia Patient Status:  Emergency    1975 MRN [id number]   Location [address] [facility] Attending Evonne Mariano MD   Hosp Day # 1 PCP Harrison Ovalle MD     Chief Complaint: visual seems to have had some improvement overnight  2. HTN-resume home meds  3.  Recent stroke-resume statin/asa  4. vapes tobacco    Quality:  · DVT Prophylaxis: scds  · CODE status: full  · Sanches: no    Plan of care discussed with patient and rn    Taryn Gomez

## 2018-04-08 NOTE — CONSULTS
[facility]    Report of Consultation    Trupti Pattenvalery Patient Status:  Inpatient    1975 MRN [id number]   [facility] 7NE-A Attending Ian Vides MD   Hosp Day # 1 PCP Almaz Rosen MD     Date of Admission:  2018  Carloz does not use drugs.     Allergies:    Penicillins             Anaphylaxis  Shellfish-Derived P*    Anaphylaxis    Medications:    Current Facility-Administered Medications:   •  DiphenhydrAMINE HCl (BENADRYL) cap 50 mg, 50 mg, Oral, Nightly PRN  •  losartan bilaterally. Motor:  No arm or leg weakness noted. Finger-to-nose coordination is intact. Gait deferred.     Diagnostic Data:   Lab Results  Component Value Date    04/07/2018   K 3.8 04/08/2018    04/07/2018   CO2 28.0 04/07/2018   BUN 23 04 opening pressure, routine studies plus oligoclonal bands, crypto. Also check TIKI, RPR, ACE, B12/B1, consider paraneoplastic anti CRMP-5/anti CV-2 (may not be available as inpatient), bartonella. Continue empiric steroids solumedrol 250mg q 6.  Discussed ca

## 2018-04-08 NOTE — ED PROVIDER NOTES
Patient Seen in: [facility] Emergency Department    History   Patient presents with: Eye Visual Problem (opthalmic)    Stated Complaint: vision loss for weeks. sent by his ophthalmologist for a \"battery of tests. \"    HPI    Patient is a 43-year-o Years: 24.00        Quit date: 1/1/2017  Smokeless tobacco: Never Used                      Comment: currently vaping  Alcohol use: No                Review of Systems    Positive for stated complaint: vision loss for weeks.  sent by his ophthalmologist appreciated. Cranial nerves II through XII are intact. Patient is answering all questions appropriately.           ED Course     Labs Reviewed   COMP METABOLIC PANEL (14) - Abnormal; Notable for the following:        Result Value    Glucose 100 (*)     BUN periventricular white matter of the right frontal lobe is stable. No evidence of infarct.     Dictated by: Domiinque Jordan MD on 4/07/2018 at 19:06     Approved by: Dominique Jordan MD            Patient had IV line established and blood work drawn in

## 2018-04-08 NOTE — PLAN OF CARE
CARDIOVASCULAR - ADULT    • Maintains optimal cardiac output and hemodynamic stability Progressing    • Absence of cardiac arrhythmias or at baseline Progressing        METABOLIC/FLUID AND ELECTROLYTES - ADULT    • Glucose maintained within prescribed rang MD message relayed to pt.  Pt has no further questions or concerns.   Hemoglobin A1C (%)   Date Value   10/24/2017 6.6 (H)

## 2018-04-09 ENCOUNTER — APPOINTMENT (OUTPATIENT)
Dept: GENERAL RADIOLOGY | Facility: HOSPITAL | Age: 43
DRG: 093 | End: 2018-04-09
Attending: Other
Payer: COMMERCIAL

## 2018-04-09 PROCEDURE — 009U3ZX DRAINAGE OF SPINAL CANAL, PERCUTANEOUS APPROACH, DIAGNOSTIC: ICD-10-PCS | Performed by: RADIOLOGY

## 2018-04-09 PROCEDURE — 99233 SBSQ HOSP IP/OBS HIGH 50: CPT | Performed by: OTHER

## 2018-04-09 PROCEDURE — 99232 SBSQ HOSP IP/OBS MODERATE 35: CPT | Performed by: HOSPITALIST

## 2018-04-09 PROCEDURE — 77003 FLUOROGUIDE FOR SPINE INJECT: CPT | Performed by: OTHER

## 2018-04-09 PROCEDURE — 62270 DX LMBR SPI PNXR: CPT | Performed by: OTHER

## 2018-04-09 RX ORDER — SODIUM CHLORIDE 9 MG/ML
INJECTION, SOLUTION INTRAVENOUS CONTINUOUS
Status: DISCONTINUED | OUTPATIENT
Start: 2018-04-09 | End: 2018-04-10 | Stop reason: HOSPADM

## 2018-04-09 RX ORDER — POTASSIUM CHLORIDE 20 MEQ/1
40 TABLET, EXTENDED RELEASE ORAL ONCE
Status: COMPLETED | OUTPATIENT
Start: 2018-04-09 | End: 2018-04-09

## 2018-04-09 RX ORDER — CLINDAMYCIN PHOSPHATE 900 MG/50ML
900 INJECTION INTRAVENOUS ONCE
Status: COMPLETED | OUTPATIENT
Start: 2018-04-09 | End: 2018-04-09

## 2018-04-09 NOTE — PROGRESS NOTES
[facility]  Progress note     Faustine Buerger Patient Status:  Emergency    1975 MRN [id number]   Location [address] [facility] Attending Keenan Velasquez MD   Hosp Day # 2 PCP Beckie Escalante MD     Chief Complaint: visual 1. Vision loss, headache, papilledema-suspect inflammatory optic neuritis vs pseudotumor; LP today; steroids for now; he thought he was improving but now doesn't think he is. 2. HTN-resume home meds  3.  Recent stroke-resume statin/asa  4. nathaniel anderson

## 2018-04-09 NOTE — PLAN OF CARE
Assumed care of pt 1930. Aox4. Neuro checks q4hrs. Pt stated central vision is blurry, can only see shapes. NPO since midnight for LP on Monday. IV steroids q6hrs. PRN tylenol given with relief for HA. POC updated with pt. Will continue to monitor.      CAR

## 2018-04-09 NOTE — PROGRESS NOTES
[id number] Progress Note    Alfonzo Foster Patient Status:  Inpatient    1975 MRN [id number]   [facility] 7NE-A Attending Marce Gavin MD   Hosp Day # 2 PCP Federico Fagan MD         Subjective:  Alfonzo Foster this can also be seen in the setting of demyelinating process. 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 No evidence of infarct.     • losartan-hydrochlorothiazide (HYZAAR 100/25) combination tabl oz   SpO2 100%   BMI 28.14 kg/m²   Neuro exam unchanged, see above for detail  Assessment:  Overall condition: unchanged  Principal Problem:    Vision loss  Active Problems:    Papilledema    Hypokalemia    Azotemia    Hyperglycemia  Nonarteritic anterior

## 2018-04-09 NOTE — PROGRESS NOTES
[facility] Dosing Service  Antibiotic Dosing    Joseph Bueno is a 36year old male - clindamycin 300 mg IV x 1 dose has been ordered for treatment of  surgical prophylaxis. Allergies: is allergic to penicillins and shellfish-derived products.

## 2018-04-09 NOTE — PROCEDURES
[facility]  Procedure Note    Chirag Perry Patient Status:  Inpatient    1975 MRN [id number]   [facility] 7NE-A Attending Stephen Schilling MD   Hosp Day # 2 PCP Alyssia Weinberg MD     Procedure: Lumbar Puncture      Findings: 14.

## 2018-04-09 NOTE — PAYOR COMM NOTE
--------------  ADMISSION REVIEW     Payor: Alexandria KELSEY/MEI  Subscriber #:  [id number]  Authorization Number: [id number]    Admit date: 4/7/18  Admit time: 2139       Admitting Physician: Marce Gavin MD  Attending Physician:  Marce Gavin MD  [facility] breath.     Past Medical History:   Diagnosis Date   • Asthma     as a child   • Essential hypertension    • Fusion of spine of cervical region    • High blood pressure    • High cholesterol      Past Surgical History:  No date: BACK SURGERY      Comment: C normoactive bowel sounds. There is no hernia. EXTREMITIES: There is no cyanosis, clubbing, or edema appreciated. Pulses are 2+ and equal in all 4 extremities. NEURO: Patient is awake, alert and oriented to time place and person.  Motor strength is 5 over discussed almost immediately with Dr. Brian Carpenter upon the patient's arrival to the ER who wanted the patient to be admitted and get IV steroids. MRI was done as noted above.   The patient was given IV steroids in the ER after discussion with Dr. Candee Eisenmenger as Anaphylaxis  Shellfish-Derived P*    Anaphylaxis    Medications:    No current facility-administered medications on file prior to encounter.    Current Outpatient Prescriptions on File Prior to Encounter:  Losartan Potassium-HCTZ 100-25 MG Oral Tab Take 1 t 78   CA  8.6   ALB  3.7   NA  141   K  3.5*   CL  107   CO2  28.0   ALKPHO  44*   AST  20   ALT  43   BILT  0.2   TP  7.4   Estimated Creatinine Clearance: 80.8 mL/min (based on SCr of 1.14 mg/dL).   Recent Labs   Lab  04/07/18   1549   PTP  13.5   INR  0.9 4/8/2018 2101 New Bag 250 mg Intravenous Maco Solano RN    4/8/2018 1428 New Bag 250 mg Intravenous Veronda Lefort, RN      0.9%  NaCl infusion     Date Action Dose Route User    4/9/2018 2175 New Bag (none) Intravenous Erendira Bird RN          REV

## 2018-04-10 ENCOUNTER — APPOINTMENT (OUTPATIENT)
Dept: MRI IMAGING | Facility: HOSPITAL | Age: 43
DRG: 093 | End: 2018-04-10
Attending: HOSPITALIST
Payer: COMMERCIAL

## 2018-04-10 PROCEDURE — 70544 MR ANGIOGRAPHY HEAD W/O DYE: CPT | Performed by: HOSPITALIST

## 2018-04-10 PROCEDURE — 99232 SBSQ HOSP IP/OBS MODERATE 35: CPT | Performed by: HOSPITALIST

## 2018-04-10 PROCEDURE — 99233 SBSQ HOSP IP/OBS HIGH 50: CPT | Performed by: OTHER

## 2018-04-10 RX ORDER — AMLODIPINE BESYLATE 5 MG/1
5 TABLET ORAL DAILY
Status: DISCONTINUED | OUTPATIENT
Start: 2018-04-10 | End: 2018-04-11

## 2018-04-10 RX ORDER — DEXTROSE MONOHYDRATE 25 G/50ML
50 INJECTION, SOLUTION INTRAVENOUS
Status: DISCONTINUED | OUTPATIENT
Start: 2018-04-10 | End: 2018-04-11

## 2018-04-10 RX ORDER — POTASSIUM CHLORIDE 20 MEQ/1
40 TABLET, EXTENDED RELEASE ORAL ONCE
Status: COMPLETED | OUTPATIENT
Start: 2018-04-10 | End: 2018-04-10

## 2018-04-10 RX ORDER — FOLIC ACID 1 MG/1
1 TABLET ORAL DAILY
Status: DISCONTINUED | OUTPATIENT
Start: 2018-04-10 | End: 2018-04-11

## 2018-04-10 RX ORDER — VITS A,C,E/LUTEIN/MINERALS 300MCG-200
1 TABLET ORAL
Status: DISCONTINUED | OUTPATIENT
Start: 2018-04-10 | End: 2018-04-11

## 2018-04-10 RX ORDER — UBIDECARENONE 75 MG
100 CAPSULE ORAL DAILY
Status: DISCONTINUED | OUTPATIENT
Start: 2018-04-10 | End: 2018-04-11

## 2018-04-10 RX ORDER — LORAZEPAM 2 MG/ML
1 INJECTION INTRAMUSCULAR ONCE
Status: COMPLETED | OUTPATIENT
Start: 2018-04-10 | End: 2018-04-10

## 2018-04-10 RX ORDER — POTASSIUM CHLORIDE 20 MEQ/1
40 TABLET, EXTENDED RELEASE ORAL EVERY 4 HOURS
Status: COMPLETED | OUTPATIENT
Start: 2018-04-10 | End: 2018-04-10

## 2018-04-10 NOTE — CONSULTS
[facility]    PATIENT'S NAME: Danica Chin   ATTENDING PHYSICIAN: HUMERA Phan PHYSICIAN: Mari Vega M.D.    PATIENT ACCOUNT#:   [de-identified]    LOCATION:  [address] [place] [address]  MEDICAL RECORD #:   [id number]       DATE OF BIRTH: [place] Woo on May 3 who diagnosed him with a hypertensive emergency and was concerned about acute CVA with bilateral optic nerve edema as blood pressure was terribly high. He was also seen in [facility] and placed on medications for that.   He was see quadrant defect. His extraocular muscle movements show a small esotropia of about 20 prism diopters on the left. His lids and fissures are symmetric. His anterior segment conjunctivae, sclerae, cornea are all grossly intact.   The anterior chambers are d other differential in this is bilateral anterior ischemic optic neuropathy. That can be secondary to hypertension, it can be postviral, it can be embolic, it can be infiltrative, it can be ischemic. I understand he has lumbar puncture that was done.   I a

## 2018-04-10 NOTE — CONSULTS
Neuro-ophthalmology  Discussed with Dr Ambreen Real and Dr Aziza Fuller  I spent 2.5 hours in face to face and phone conversation. 35 yo  with amaurosis Fugax OD 5 days prior to seeing PCP on 3/20/18. Consult dictated. [id number]      PMH  Amblyopia OS s/p pa

## 2018-04-10 NOTE — PLAN OF CARE
Screening complete for MRV Head. Pt states he gets very claustrophobic. Dr. Francis Estimable notified. Orders for one time dose of 1 mg IV Ativan prior.

## 2018-04-10 NOTE — PROGRESS NOTES
[id number] [facility] Progress Note    Ceciliojacklyn Chip Patient Status:  Inpatient    1975 MRN [id number]   [facility] 7NE-A Attending Julia Villafana MD   Hosp Day # 3 PCP Chino Lombardi MD         Subjective:  Marcus Saunders process.     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     No evidence of infarct.     • Potassium Chloride ER  40 mEq Oral Q4H   • losartan-hydrochlorothiazide (HYZAAR 100/25) combination tablet   Oral D independently, reviewed history, labs and imaging, and agree with above note with following additions:  S: no new complaints, vision loss unchanged  O: /90 (BP Location: Left arm)   Pulse 88   Temp (!) 97.5 °F (36.4 °C) (Oral)   Resp 18   Ht 68\"   W

## 2018-04-10 NOTE — PLAN OF CARE
Assumed patient care 0730. Patient a/o x4. RA, B/P elevated, HR 70s. Dr. Kimberlee Grover aware. Vision continues to be blurred. Ophthalmology Dr. Efrain Doherty in today to see patient. IV Thiamine start, IV steroids continued. Plans for MRV Head today.  Needs addressed an

## 2018-04-10 NOTE — PROGRESS NOTES
[facility]ITAL[facility]  Progress note     Nolon Goes Patient Status:  Emergency    1975 MRN [id number]   Location [address] Attending Natalya Brasher MD   Hosp Day # 3 PCP Corin Lara MD     Chief Complaint: visual data reviewed in Epic. ASSESSMENT / PLAN:     1. Vision loss, headache, papilledema-possibly due to HTN; unclear. Probably needs university transfer. Awaiting recs from dr. Nanci Padgett from neuroptho. 2. HTN-resume home meds  3.  Recent stroke-resume stati

## 2018-04-10 NOTE — PLAN OF CARE
Assumed patient care 1500. Patient a/o x4. Neuros. Vision blurry. VSS during shift, RA. Up as tolerated. LP today, bandaid in place, site dry and intact. Needs addressed and attended too.

## 2018-04-10 NOTE — PLAN OF CARE
Assumed care of pt 1930. aox4. Neuro checks q4hrs. IVPB steroids as ordered. PRN tylenol for HA with relief. LP sie with band-aid, no drainage noted. Site soft, nontender. POC updated with pt. Will continue to monitor.   1659: received a call from Dr. Smith Area

## 2018-04-11 ENCOUNTER — TELEPHONE (OUTPATIENT)
Dept: SURGERY | Facility: CLINIC | Age: 43
End: 2018-04-11

## 2018-04-11 VITALS
SYSTOLIC BLOOD PRESSURE: 177 MMHG | WEIGHT: 185.13 LBS | HEART RATE: 77 BPM | OXYGEN SATURATION: 96 % | RESPIRATION RATE: 18 BRPM | HEIGHT: 68 IN | TEMPERATURE: 98 F | BODY MASS INDEX: 28.06 KG/M2 | DIASTOLIC BLOOD PRESSURE: 98 MMHG

## 2018-04-11 PROCEDURE — 99239 HOSP IP/OBS DSCHRG MGMT >30: CPT | Performed by: HOSPITALIST

## 2018-04-11 PROCEDURE — 99233 SBSQ HOSP IP/OBS HIGH 50: CPT | Performed by: OTHER

## 2018-04-11 RX ORDER — POTASSIUM CHLORIDE 20 MEQ/1
40 TABLET, EXTENDED RELEASE ORAL EVERY 4 HOURS
Status: COMPLETED | OUTPATIENT
Start: 2018-04-11 | End: 2018-04-11

## 2018-04-11 RX ORDER — MELATONIN
100 DAILY
Qty: 30 TABLET | Refills: 2 | Status: SHIPPED | OUTPATIENT
Start: 2018-04-11 | End: 2019-06-05

## 2018-04-11 RX ORDER — AMLODIPINE BESYLATE 5 MG/1
5 TABLET ORAL DAILY
Qty: 30 TABLET | Refills: 2 | Status: SHIPPED | OUTPATIENT
Start: 2018-04-11 | End: 2018-07-23

## 2018-04-11 RX ORDER — CLONIDINE HYDROCHLORIDE 0.3 MG/1
0.3 TABLET ORAL 2 TIMES DAILY
Qty: 60 TABLET | Refills: 2 | Status: SHIPPED | OUTPATIENT
Start: 2018-04-11 | End: 2019-06-05

## 2018-04-11 RX ORDER — VITS A,C,E/LUTEIN/MINERALS 300MCG-200
1 TABLET ORAL
Qty: 30 TABLET | Refills: 2 | Status: SHIPPED | OUTPATIENT
Start: 2018-04-11 | End: 2019-06-05

## 2018-04-11 RX ORDER — PREDNISONE 20 MG/1
TABLET ORAL
Qty: 15 TABLET | Refills: 0 | Status: SHIPPED | OUTPATIENT
Start: 2018-04-11 | End: 2019-06-05

## 2018-04-11 RX ORDER — PREDNISONE 10 MG/1
TABLET ORAL
Qty: 30 TABLET | Refills: 0 | Status: SHIPPED | OUTPATIENT
Start: 2018-04-11 | End: 2018-04-11

## 2018-04-11 RX ORDER — PREDNISONE 20 MG/1
TABLET ORAL
Qty: 15 TABLET | Refills: 0 | Status: SHIPPED | OUTPATIENT
Start: 2018-04-11 | End: 2018-04-11

## 2018-04-11 RX ORDER — FOLIC ACID 1 MG/1
1 TABLET ORAL DAILY
Qty: 30 TABLET | Refills: 2 | Status: SHIPPED | OUTPATIENT
Start: 2018-04-11 | End: 2018-07-19

## 2018-04-11 NOTE — PROGRESS NOTES
Vision improving per patient report. i'm ok with d/c today if ok with dr. Kian Barba. Will need close outpatient f/u.     Kin Comment, MD  [facility]  Internal Medicine Hospitalist  Pager [phone]

## 2018-04-11 NOTE — CONSULTS
Ophthalmology Exam    Patient reports vision is better today than on admission. He is able to ambulate about the room and perform daily activities of life.  He describes his vision this afternoon to be worse than this morning and seems to wax and wane throu

## 2018-04-11 NOTE — CM/SW NOTE
Pt readmitted for vision loss. Pt was screened during rounds and no needs are identified at this time. RN to contact SW/CM if needs arise.       04/11/18 1400   CM/SW Screening   Referral Source Social Work (self-referral)   Information Source Nursing rou

## 2018-04-11 NOTE — PLAN OF CARE
Assumed patient care at 0730. BP elevated, medications given per MAR. All other vital signs stable. Patient reports vision is blurry, cannot see far distances. No new vision changes. Okay to dc per neuro and Dr. Blessing Patel.    Plan of care discussed with david

## 2018-04-11 NOTE — PROGRESS NOTES
Lloyd for discharge. New prescriptions given, including prednisone 60 mg/day per Dr. eDmar Loja. Follow up and medication instructions provided.    IV was removed this am. Tele removed   Discharged home with mother with all belongings

## 2018-04-11 NOTE — TELEPHONE ENCOUNTER
Pt states his marijuana use was 10 years ago and over the counter NSAIDs don't help with his pain, but then stated \"that's ok\". No additional needs at this time.

## 2018-04-11 NOTE — TELEPHONE ENCOUNTER
Pt has been hospitalized and will be discharged today. Per pt's Opthalmologist pt should not have injection at this time. Pt asking for oral pain medications, will speak with Provider. Injection cancelled for 4-11-18, pt to call to reschedule.

## 2018-04-11 NOTE — PROGRESS NOTES
[id number] [facility] Progress Note    Faustine Buerger Patient Status:  Inpatient    1975 MRN [id number]   [facility] 7NE-A Attending Kenia Cruz MD   Hosp Day # 4 PCP Beckie Escalante MD         Subjective:  Faustine Buerger IgG all WNL, Cryptococcus neg, rest of CSF tests pending   HIV, T Pallidum both non-reactive  ACE WNL  ESR and CRP unremarkable  TIKI neg  HgbA1c 5.3  Vitamins: B12 754, B1/Thiamine   Hypercoag studies all pending  Heavy metals pending  Drug screen pending Neurology  [facility]  4/11/2018

## 2018-04-11 NOTE — PAYOR COMM NOTE
--------------  CONTINUED STAY REVIEW    Payor: [place] KELSEY/MEI  Subscriber #:  [id number]  Authorization Number: [id number]    Admit date: 4/7/18  Admit time: 2139    Admitting Physician: Nanci De La Cruz MD  Attending Physician:  Nanci De La Cruz MD  Pr Action Dose Route User    4/10/2018 2025 Given 80 mg Oral Geradine KRISTEN Pisano      cetirizine (ZYRTEC) tab 10 mg     Date Action Dose Route User    4/11/2018 0930 Given 10 mg Oral Neftali Navarrete RN      CloNIDine HCl (CATAPRES) tab 0.2 mg     Date Actio 4/11/2018 0949 Given 1 tablet Oral Ambar Syed RN      Pantoprazole Sodium (PROTONIX) EC tab 40 mg     Date Action Dose Route User    4/11/2018 0762 Given 40 mg Oral Teodora Carranza RN      Potassium Chloride ER (K-DUR M20) CR tab 40 mEq     Date Ac

## 2018-04-12 NOTE — DISCHARGE SUMMARY
[facility]ITAL[facility]  DISCHARGE SUMMARY     Sam Duron Patient Status:  Inpatient    1975 MRN [id number]   [facility] 7NE-A Attending No att. providers found   Hosp Day # 4 PCP oRbles Krishnamurthy MD     Date of Admission: 2018  Date neuropathy. Still had suboptimal bp control while admitted here, so changed his bp meds further; see below. Was d/c'd on prednisone taper with very close optho f/u.      Procedures during hospitalization:   • See above    Incidental or significant finding tablet  Refills:  5     Losartan Potassium-HCTZ 100-25 MG Tabs  Commonly known as:  HYZAAR      Take 1 tablet by mouth daily.    Quantity:  30 tablet  Refills:  5           Where to Get Your Medications      Please  your prescriptions at the location

## 2018-04-19 ENCOUNTER — OFFICE VISIT (OUTPATIENT)
Dept: INTERNAL MEDICINE CLINIC | Facility: CLINIC | Age: 43
End: 2018-04-19

## 2018-04-19 VITALS
BODY MASS INDEX: 32.53 KG/M2 | DIASTOLIC BLOOD PRESSURE: 78 MMHG | SYSTOLIC BLOOD PRESSURE: 128 MMHG | HEIGHT: 67 IN | WEIGHT: 207.25 LBS | TEMPERATURE: 98 F | HEART RATE: 64 BPM | RESPIRATION RATE: 16 BRPM

## 2018-04-19 DIAGNOSIS — I10 ESSENTIAL HYPERTENSION: Primary | ICD-10-CM

## 2018-04-19 PROBLEM — I16.1 HYPERTENSIVE EMERGENCY: Status: RESOLVED | Noted: 2018-03-20 | Resolved: 2018-04-19

## 2018-04-19 PROBLEM — R79.89 AZOTEMIA: Status: RESOLVED | Noted: 2018-04-07 | Resolved: 2018-04-19

## 2018-04-19 PROBLEM — E87.6 HYPOKALEMIA: Status: RESOLVED | Noted: 2018-04-07 | Resolved: 2018-04-19

## 2018-04-19 LAB
ALBUMIN: 3.7 G/DL
ALKALINE PHOSPHATATE(ALK PHOS): 44 IU/L
BILIRUBIN TOTAL: 0.2 MG/DL
BUN: 23 MG/DL
CALCIUM: 8.6 MG/DL
CHLORIDE: 107 MEQ/L
CREATININE, SERUM: 1.14 MG/DL
ESR (SED RATE): 13 MM/HR
GLUCOSE: 100 MG/DL
HEMATOCRIT: 41.1 %
HEMOGLOBIN: 14.3 G/DL
PLATELETS: 301 K/UL
POTASSIUM, SERUM: 3.5 MEQ/L
POTASSIUM, SERUM: 4 MEQ/L
PROTEIN, TOTAL: 7.4 G/DL
RED BLOOD COUNT: 4.72 X 10-6/U
SGOT (AST): 20 IU/L
SGPT (ALT): 43 IU/L
SODIUM: 141 MEQ/L
WHITE BLOOD COUNT: 7.6 X 10-3/U

## 2018-04-19 PROCEDURE — 99213 OFFICE O/P EST LOW 20 MIN: CPT | Performed by: INTERNAL MEDICINE

## 2018-04-19 NOTE — PROGRESS NOTES
Patient presents with: Follow - Up: b/p      HPI: The pt presents today for 2 wk f/u HTN. BPs are now stable on prescription medication and w/o SEs. Review of Systems   Constitutional: Negative.     Eyes:        Still having visual issues, improving w/ aspirin 325 MG Oral Tab, Take 1 tablet (325 mg total) by mouth daily. , Disp: 30 tablet, Rfl: 1    Smoking status: Former Smoker                                                              Packs/day: 1.00      Years: 24.00        Quit date: 1/1/2017  Smoke

## 2018-04-20 ENCOUNTER — MED REC SCAN ONLY (OUTPATIENT)
Dept: INTERNAL MEDICINE CLINIC | Facility: CLINIC | Age: 43
End: 2018-04-20

## 2018-05-29 ENCOUNTER — TELEPHONE (OUTPATIENT)
Dept: INTERNAL MEDICINE CLINIC | Facility: CLINIC | Age: 43
End: 2018-05-29

## 2018-05-29 NOTE — TELEPHONE ENCOUNTER
Patient calling in stated she is congested and has a sore throat. Offered pt an appointment to be seen. Pt declined and stated he just wanted some antibiotics to help with his symptoms. Please call patient to discuss.

## 2018-05-29 NOTE — TELEPHONE ENCOUNTER
Antibiotic denied and not appropriate. Viral etiology is the cause in > 90% of similar cases. Advise support measures. Samson Lynn. Erasmo Handley MD  Diplomate, American Board of Internal Medicine  [facility]  [address] [facility],[address], [address], [facility],

## 2018-06-04 ENCOUNTER — TELEPHONE (OUTPATIENT)
Dept: INTERNAL MEDICINE CLINIC | Facility: CLINIC | Age: 43
End: 2018-06-04

## 2018-06-04 RX ORDER — DEXTROMETHORPHAN HYDROBROMIDE AND PROMETHAZINE HYDROCHLORIDE 15; 6.25 MG/5ML; MG/5ML
5 SYRUP ORAL 4 TIMES DAILY PRN
Qty: 180 ML | Refills: 0 | Status: SHIPPED | OUTPATIENT
Start: 2018-06-04 | End: 2019-06-05

## 2018-06-04 RX ORDER — AZITHROMYCIN 250 MG/1
TABLET, FILM COATED ORAL
Qty: 6 TABLET | Refills: 0 | Status: SHIPPED | OUTPATIENT
Start: 2018-06-04 | End: 2019-06-05 | Stop reason: ALTCHOICE

## 2018-06-04 NOTE — TELEPHONE ENCOUNTER
Patient has been using OTC medication and feels worse; for URI patient requests call from nurse to triage and possible get something called in from Dr Marco Phan

## 2018-06-04 NOTE — TELEPHONE ENCOUNTER
Pt  c/o dry cough with some difficulty breathing especially at night. No improvement since he called on 5/29. Tried Mucinex with no relief. No c/o fever or sore throat.

## 2018-06-04 NOTE — TELEPHONE ENCOUNTER
I sent 2 scripts to his pharmacy. 1st is an antibiotic. 2nd is a cough medicine. Claudia Gonzalez. Luigi Crespo MD  Diplomate, American Board of Internal Medicine  [facility]  [address] [facility],[address] Floor, [address], [facility], [address] [place] [address]  T: [phone]; F: [phone].

## 2018-07-19 ENCOUNTER — OFFICE VISIT (OUTPATIENT)
Dept: INTERNAL MEDICINE CLINIC | Facility: CLINIC | Age: 43
End: 2018-07-19
Payer: COMMERCIAL

## 2018-07-19 VITALS
DIASTOLIC BLOOD PRESSURE: 74 MMHG | HEIGHT: 69 IN | SYSTOLIC BLOOD PRESSURE: 122 MMHG | HEART RATE: 69 BPM | BODY MASS INDEX: 29.62 KG/M2 | RESPIRATION RATE: 16 BRPM | WEIGHT: 200 LBS

## 2018-07-19 DIAGNOSIS — H35.033 HYPERTENSIVE RETINOPATHY OF BOTH EYES: ICD-10-CM

## 2018-07-19 DIAGNOSIS — I69.398 DISTURBANCES OF VISION, LATE EFFECT OF STROKE: ICD-10-CM

## 2018-07-19 DIAGNOSIS — H53.40 VISUAL FIELD DEFECTS: ICD-10-CM

## 2018-07-19 DIAGNOSIS — H47.10 OPTIC NERVE EDEMA: ICD-10-CM

## 2018-07-19 DIAGNOSIS — H53.9 DISTURBANCES OF VISION, LATE EFFECT OF STROKE: ICD-10-CM

## 2018-07-19 DIAGNOSIS — I10 ESSENTIAL HYPERTENSION: Primary | ICD-10-CM

## 2018-07-19 DIAGNOSIS — R22.2 SUPRACLAVICULAR FOSSA FULLNESS: ICD-10-CM

## 2018-07-19 PROBLEM — R73.9 HYPERGLYCEMIA: Status: RESOLVED | Noted: 2018-04-07 | Resolved: 2018-07-19

## 2018-07-19 PROCEDURE — 99214 OFFICE O/P EST MOD 30 MIN: CPT | Performed by: INTERNAL MEDICINE

## 2018-07-19 RX ORDER — FOLIC ACID 1 MG/1
1 TABLET ORAL DAILY
Qty: 30 TABLET | Refills: 5 | Status: SHIPPED | OUTPATIENT
Start: 2018-07-19 | End: 2019-02-18

## 2018-07-19 RX ORDER — LABETALOL 200 MG/1
400 TABLET, FILM COATED ORAL 2 TIMES DAILY
Qty: 60 TABLET | Refills: 5 | Status: SHIPPED | OUTPATIENT
Start: 2018-07-19 | End: 2019-06-05

## 2018-07-19 NOTE — PROGRESS NOTES
Patient presents with:  Hypertension: medication follow up  Other: desires new referral for neuro-ophtho; also fullness in the R supraclavicular fossa for nearly 1 month      HPI: The pt presents today for eval of the following issues:  1.  HTN - Stable on tablet, Rfl: 2  •  AmLODIPine Besylate 5 MG Oral Tab, Take 1 tablet (5 mg total) by mouth daily. , Disp: 30 tablet, Rfl: 2  •  OCUVITE-LUTEIN Oral Tab, Take 1 tablet by mouth daily with breakfast., Disp: 30 tablet, Rfl: 2  •  Thiamine HCl 100 MG Oral Tab, T good.\"  No medication SEs. Compliant w/ prescription medication. Send to Cardiology. 2. Vision disturbances from late effect of stroke/optic nerve edema hx, and visual field defects - Still ongoing issue.   He's worked w/ Dr. José Manuel Jasso, Dr. Sergio Norwood

## 2018-07-23 ENCOUNTER — TELEPHONE (OUTPATIENT)
Dept: INTERNAL MEDICINE CLINIC | Facility: CLINIC | Age: 43
End: 2018-07-23

## 2018-07-23 DIAGNOSIS — Z91.013 SHELLFISH ALLERGY: Primary | ICD-10-CM

## 2018-07-23 RX ORDER — AMLODIPINE BESYLATE 5 MG/1
5 TABLET ORAL DAILY
Qty: 90 TABLET | Refills: 1 | Status: SHIPPED | OUTPATIENT
Start: 2018-07-23 | End: 2019-02-18

## 2018-07-23 RX ORDER — EPINEPHRINE 0.3 MG/.3ML
0.3 INJECTION SUBCUTANEOUS ONCE
Qty: 1 EACH | Refills: 0 | Status: SHIPPED | OUTPATIENT
Start: 2018-07-23 | End: 2018-07-23

## 2018-07-23 NOTE — TELEPHONE ENCOUNTER
Refill needed (previously prescribed by Dr. Abelino Powell)    [facility]    AmLODIPine Besylate 5 MG Oral Tab

## 2018-07-23 NOTE — TELEPHONE ENCOUNTER
Tell him script sent to his pharmacy. Samson Lynn. Erasmo Handley MD  Diplomate, American Board of Internal Medicine  [facility]  [address] [facility],[address] Floor, [address], [facility], [address]  T: [phone]; F: Jah 5

## 2018-07-23 NOTE — TELEPHONE ENCOUNTER
Refill requested: Amlodipine 5 mg     Passed protocol      Last refill: 4/11/18 Amlodipine 5 mg #30 2R Dr Delgadillo Juan Carlos: CMP 4/7/18    BP Readings from Last 3 Encounters:  07/19/18 : 122/74  04/19/18 : 128/78  04/11/18 : (!) 177/98      Last OV

## 2018-07-23 NOTE — TELEPHONE ENCOUNTER
Referred to opthalmology -first available appointment August 27    Patient was advised by Dr. Taylor Rolon office that if Dr. Neeta Hensley called he might be able to get in sooner than Aug 27

## 2018-07-24 NOTE — TELEPHONE ENCOUNTER
Call placed to Dr Bro Corporal office for a sooner appointment. Instructed per staff member Dr Matt Jovel will need to call Dr Orlando Woodruff personally to request a sooner appointment. #[phone] to have Dr Orlando Woodruff paged.

## 2018-07-24 NOTE — TELEPHONE ENCOUNTER
He will likely have to wait until that appointment date. Neuro-ophtho docs are few in # so a 1 month wait is not unheard of. Dr. Ottoniel Garsia does have a partner of his in [facility] (I can't remember the name), also a neuro-ophtho which is an option as well.

## 2018-08-16 ENCOUNTER — TELEPHONE (OUTPATIENT)
Dept: INTERNAL MEDICINE CLINIC | Facility: CLINIC | Age: 43
End: 2018-08-16

## 2018-08-16 NOTE — TELEPHONE ENCOUNTER
Patient informed of order, provided contact information for Rowena, patient verbalized understanding/ email sent to [facility] also

## 2018-08-16 NOTE — TELEPHONE ENCOUNTER
Patient can see Alyce Handing in-house. Help get him set up. Magdy Gaines. Amy Mckeon MD  Diplomate, American Board of Internal Medicine  [facility]  [address] [facility],[address], [address], [facility], [address] [place] [address]  T: [phone]; F: [address]

## 2018-08-16 NOTE — TELEPHONE ENCOUNTER
Patient stated that he has recently suffered a stroke and is now blind and he feels that has PTSD and would like to be referred to someone that could help him. Please advise.

## 2018-09-24 ENCOUNTER — TELEPHONE (OUTPATIENT)
Dept: INTERNAL MEDICINE CLINIC | Facility: CLINIC | Age: 43
End: 2018-09-24

## 2018-09-24 DIAGNOSIS — R06.81 WITNESSED APNEIC SPELLS: Primary | ICD-10-CM

## 2018-09-24 DIAGNOSIS — R06.83 SNORING: ICD-10-CM

## 2018-09-24 DIAGNOSIS — R40.0 DAYTIME SOMNOLENCE: ICD-10-CM

## 2018-09-24 NOTE — TELEPHONE ENCOUNTER
Test ordered. Get him the contact info to sleep center. Lian Oro. Aleah Prado MD  Diplomate, American Board of Internal Medicine  [facility]  [address] [facility],[address] Floor, [address], [facility], [address] [place] [address]  T: [phone]; F: Jah James

## 2018-09-24 NOTE — TELEPHONE ENCOUNTER
Pt c/o daytime sleepiness, snoring and witnessed Apnea episodes by previous girlfriend. Sometimes he wakes at night and its hard to catch his breath. Pt  will also drop off visit notes from Dr Jai Hill.

## 2018-09-24 NOTE — TELEPHONE ENCOUNTER
Patient was dx as legally blind during visit to neuro ophthalmologist, Dr. Chasity Le advised pt that sleep apnea may have contributed to dx, advised pt to have sleep study done    Call pt to advise

## 2018-09-24 NOTE — TELEPHONE ENCOUNTER
Get more information. Has patient had any of the following additional symptoms:    1. Snoring. 2. Witnessed apneas. 3. Daytime Somnolence. Please let me know. Claudia Gonzalez.  Luigi Crespo MD  Diplomate, American Board of Internal Medicine  [place] [address]

## 2018-10-15 RX ORDER — LOSARTAN POTASSIUM AND HYDROCHLOROTHIAZIDE 25; 100 MG/1; MG/1
TABLET ORAL
Qty: 30 TABLET | Refills: 5 | Status: SHIPPED | OUTPATIENT
Start: 2018-10-15 | End: 2019-06-05

## 2018-10-15 NOTE — TELEPHONE ENCOUNTER
Losartan hctz 100-25 mg 1 tab daily filled 4-3-18 30 with  5 refills     LOV 7-19-18     HTN - Stable on prescription medication, but he'd like to see a cardiologist \"just to make sure I'm all good. \"      RTC 6 months      Next apt 1-21-19

## 2018-10-16 NOTE — TELEPHONE ENCOUNTER
Pharmacy calling in requesting a refill for atorvastatin 80 MG Oral Tab    To be sent to: [facility] [place], [place] - [address] [place] [address] [phone], [phone]

## 2018-10-17 RX ORDER — ATORVASTATIN CALCIUM 80 MG/1
80 TABLET, FILM COATED ORAL NIGHTLY
Qty: 30 TABLET | Refills: 5 | Status: SHIPPED | OUTPATIENT
Start: 2018-10-17 | End: 2019-06-05

## 2019-02-19 RX ORDER — FOLIC ACID 1 MG/1
TABLET ORAL
Qty: 90 TABLET | Refills: 1 | Status: SHIPPED | OUTPATIENT
Start: 2019-02-19 | End: 2019-10-17

## 2019-02-19 RX ORDER — HYDROCHLOROTHIAZIDE 25 MG/1
25 TABLET ORAL DAILY
Qty: 90 TABLET | Refills: 0 | Status: SHIPPED | OUTPATIENT
Start: 2019-02-19 | End: 2019-06-05

## 2019-02-19 RX ORDER — LOSARTAN POTASSIUM 100 MG/1
100 TABLET ORAL DAILY
Qty: 90 TABLET | Refills: 0 | Status: SHIPPED | OUTPATIENT
Start: 2019-02-19 | End: 2019-06-05

## 2019-02-19 RX ORDER — AMLODIPINE BESYLATE 5 MG/1
TABLET ORAL
Qty: 90 TABLET | Refills: 1 | Status: SHIPPED | OUTPATIENT
Start: 2019-02-19 | End: 2019-06-05

## 2019-02-19 NOTE — TELEPHONE ENCOUNTER
Losartan/hctz 100-25 mg combination backordered also to expensive. And individually cost much less and are in stock.

## 2019-02-19 NOTE — TELEPHONE ENCOUNTER
Refill requested:   Requested Prescriptions     Pending Prescriptions Disp Refills   • FOLIC ACID 1 MG Oral Tab [Pharmacy Med Name: FOLIC ACID 1MG      TAB] 30 tablet 5     Sig: TAKE 1 TABLET BY MOUTH ONCE DAILY   • AMLODIPINE BESYLATE 5 MG Oral Tab [Pharm

## 2019-03-27 RX ORDER — LOSARTAN POTASSIUM 100 MG/1
TABLET ORAL
Qty: 90 TABLET | Refills: 0 | OUTPATIENT
Start: 2019-03-27

## 2019-04-08 RX ORDER — LOSARTAN POTASSIUM 100 MG/1
TABLET ORAL
Qty: 90 TABLET | Refills: 0 | OUTPATIENT
Start: 2019-04-08

## 2019-04-08 NOTE — TELEPHONE ENCOUNTER
Losartan 100 mg 1 tab daily filled 2-19-19 90 with 0 refills     LOV 7-19-18     HTN - Stable on prescription medication, but he'd like to see a cardiologist \"just to make sure I'm all good. RTC 6 months.      No upcoming apt on file     Labs 10-18-18

## 2019-05-21 RX ORDER — CLONIDINE HYDROCHLORIDE 0.2 MG/1
TABLET ORAL
Qty: 60 TABLET | Refills: 5 | OUTPATIENT
Start: 2019-05-21

## 2019-05-21 NOTE — TELEPHONE ENCOUNTER
Clonidine 0.2 mg 1 tab bid filled     Clonidine 0.3 mg 1 tab bid filled on 4-11-18 60 with 2 refill     LOv 7-19-18   .  RTC 6 months

## 2019-05-23 RX ORDER — CLONIDINE HYDROCHLORIDE 0.2 MG/1
TABLET ORAL
Qty: 60 TABLET | Refills: 5 | OUTPATIENT
Start: 2019-05-23

## 2019-05-24 NOTE — TELEPHONE ENCOUNTER
Future Appointments   Date Time Provider Clement Paez   6/5/2019  2:00 PM Brianda Chang MD [facility] [place]

## 2019-06-05 ENCOUNTER — OFFICE VISIT (OUTPATIENT)
Dept: INTERNAL MEDICINE CLINIC | Facility: CLINIC | Age: 44
End: 2019-06-05

## 2019-06-05 VITALS
BODY MASS INDEX: 28.7 KG/M2 | SYSTOLIC BLOOD PRESSURE: 160 MMHG | WEIGHT: 185 LBS | DIASTOLIC BLOOD PRESSURE: 98 MMHG | RESPIRATION RATE: 16 BRPM | TEMPERATURE: 98 F | HEART RATE: 84 BPM | HEIGHT: 67.5 IN

## 2019-06-05 DIAGNOSIS — I10 ESSENTIAL HYPERTENSION: Primary | ICD-10-CM

## 2019-06-05 DIAGNOSIS — E78.00 HYPERCHOLESTEROLEMIA: ICD-10-CM

## 2019-06-05 DIAGNOSIS — H47.293 SECONDARY OPTIC ATROPHY OF BOTH EYES: ICD-10-CM

## 2019-06-05 DIAGNOSIS — J45.20 MILD INTERMITTENT ASTHMA IN ADULT WITHOUT COMPLICATION: ICD-10-CM

## 2019-06-05 PROCEDURE — 99214 OFFICE O/P EST MOD 30 MIN: CPT | Performed by: INTERNAL MEDICINE

## 2019-06-05 RX ORDER — LOSARTAN POTASSIUM AND HYDROCHLOROTHIAZIDE 25; 100 MG/1; MG/1
1 TABLET ORAL DAILY
Qty: 30 TABLET | Refills: 11 | Status: SHIPPED | OUTPATIENT
Start: 2019-06-05 | End: 2020-02-03 | Stop reason: ALTCHOICE

## 2019-06-05 RX ORDER — AMLODIPINE BESYLATE 10 MG/1
10 TABLET ORAL DAILY
Qty: 30 TABLET | Refills: 11 | Status: SHIPPED | OUTPATIENT
Start: 2019-06-05 | End: 2020-02-03 | Stop reason: ALTCHOICE

## 2019-06-05 RX ORDER — CLONIDINE HYDROCHLORIDE 0.3 MG/1
0.3 TABLET ORAL DAILY
Qty: 30 TABLET | Refills: 11 | Status: SHIPPED | OUTPATIENT
Start: 2019-06-05 | End: 2020-02-03

## 2019-06-05 RX ORDER — ALBUTEROL SULFATE 90 UG/1
1-2 AEROSOL, METERED RESPIRATORY (INHALATION)
COMMUNITY
End: 2020-10-09

## 2019-06-05 RX ORDER — CLONIDINE HYDROCHLORIDE 0.3 MG/1
0.3 TABLET ORAL DAILY
COMMUNITY
End: 2019-06-05

## 2019-06-05 NOTE — PROGRESS NOTES
Rama Guajardo is a 39year old male. HPI:   Patient presents with:  Blood Pressure    Patient presents for follow up on chronic medical issues. Hypertension - above goal.  Just started taking medications again.     Hypercholesterolemia - was on statin state, Asthma, Blind, Essential hypertension, Fusion of spine of cervical region, High blood pressure, High cholesterol, Migraines, Stroke ([facility] [address] and Visual impairment.   Surgical:  has a past surgical history that includes repair ing hernia,4+y/o,reducibl; for CMP. - amLODIPine Besylate 10 MG Oral Tab; Take 1 tablet (10 mg total) by mouth daily. Dispense: 30 tablet; Refill: 11  - Losartan Potassium-HCTZ 100-25 MG Oral Tab; Take 1 tablet by mouth daily. Dispense: 30 tablet;  Refill: 11  - cloNIDine HCl 0.3

## 2019-06-05 NOTE — PATIENT INSTRUCTIONS
- We will increase your amlodipine dose to 10 mg daily. - Continue losartan-HCTZ 100-25 mg daily (we will combine this into 1 tablet)  - Continue clonidine 0.3 mg daily  - Have your sister download \"[employer]\" maria t on her smartphone.   Look up all of these m

## 2019-09-05 ENCOUNTER — TELEPHONE (OUTPATIENT)
Dept: INTERNAL MEDICINE CLINIC | Facility: CLINIC | Age: 44
End: 2019-09-05

## 2019-09-05 NOTE — TELEPHONE ENCOUNTER
Patient dropped off form to be completed: Persons with Disabilities Certification for Rahel Murphy in Dr. Cindy Fields folder

## 2019-09-07 NOTE — TELEPHONE ENCOUNTER
In my Outbox. Lian Oro. Aleah Prado MD  Diplomate, American Board of Internal Medicine  [address] [address] [address]  [facility],4Th Floor, [address], [facility], [address] [place] [address]  T: [phone]; F: Jah 5

## 2019-10-10 RX ORDER — AMLODIPINE BESYLATE 5 MG/1
TABLET ORAL
Qty: 90 TABLET | Refills: 1 | Status: SHIPPED | OUTPATIENT
Start: 2019-10-10 | End: 2020-04-20

## 2019-10-10 NOTE — TELEPHONE ENCOUNTER
Failed protocol     Last refill:  6/5/2019 Amlodipine 10 mg #30 11R    LOV:   6/5/2019 Dr Sayda Mendez RTC 3-6 months with Dr Erasmo Handley  1. Essential hypertension  Above goal.  Will increase amlodipine to 10 mg qd.   Continue losartan-HCTZ 100-25 mg qd (will combine

## 2019-10-17 RX ORDER — FOLIC ACID 1 MG/1
TABLET ORAL
Qty: 90 TABLET | Refills: 1 | Status: SHIPPED | OUTPATIENT
Start: 2019-10-17 | End: 2020-04-28

## 2019-10-17 NOTE — TELEPHONE ENCOUNTER
Folic acid 1 mg 1 tab daily filled 2-19-19 90 with 1 refill     LOV 6-5-19   return to clinic in 3-6 months   No upcoming apt on file   Labs 10-10-18

## 2019-10-21 RX ORDER — LOSARTAN POTASSIUM AND HYDROCHLOROTHIAZIDE 25; 100 MG/1; MG/1
TABLET ORAL
Qty: 30 TABLET | Refills: 5 | Status: SHIPPED | OUTPATIENT
Start: 2019-10-21 | End: 2020-02-03 | Stop reason: ALTCHOICE

## 2019-10-21 NOTE — TELEPHONE ENCOUNTER
Losartan hctz 100-25 mg 1 tab daily filled 6-5-19 30 with 11 refills     LOV 6-5-19   return to clinic in 3-6 months   No upcoming apt on file   Labs 4-7-18   No insurance unable to afford labs

## 2019-11-21 NOTE — TELEPHONE ENCOUNTER
Hydrochlorothiazide 25 mg 1 tab daily filled   Losartan 100 mg 1 tab daily filled     Combo med on back order.      LOV 6-5-19   return to clinic in 3-6 months   No upcoming up on file   Labs 4-7-18

## 2019-11-21 NOTE — TELEPHONE ENCOUNTER
[address] calling in requesting a refill for:    LOSARTAN POTASSIUM    HCTZ 100-25 MG Oral Tab    The combination is on back order so it will need to be split.     Pharmacy:   [facility], [address] [place] [address] [phone]

## 2019-11-22 RX ORDER — LOSARTAN POTASSIUM 100 MG/1
100 TABLET ORAL DAILY
Qty: 90 TABLET | Refills: 0 | Status: SHIPPED | OUTPATIENT
Start: 2019-11-22 | End: 2020-08-25 | Stop reason: ALTCHOICE

## 2019-11-22 RX ORDER — HYDROCHLOROTHIAZIDE 25 MG/1
25 TABLET ORAL DAILY
Qty: 90 TABLET | Refills: 0 | Status: SHIPPED | OUTPATIENT
Start: 2019-11-22 | End: 2020-08-25 | Stop reason: ALTCHOICE

## 2020-02-03 ENCOUNTER — OFFICE VISIT (OUTPATIENT)
Dept: INTERNAL MEDICINE CLINIC | Facility: CLINIC | Age: 45
End: 2020-02-03
Payer: COMMERCIAL

## 2020-02-03 VITALS
WEIGHT: 149.5 LBS | SYSTOLIC BLOOD PRESSURE: 162 MMHG | BODY MASS INDEX: 23.19 KG/M2 | HEART RATE: 60 BPM | HEIGHT: 67.5 IN | OXYGEN SATURATION: 98 % | DIASTOLIC BLOOD PRESSURE: 102 MMHG | RESPIRATION RATE: 16 BRPM | TEMPERATURE: 98 F

## 2020-02-03 DIAGNOSIS — G89.29 CHRONIC PAIN OF RIGHT KNEE: ICD-10-CM

## 2020-02-03 DIAGNOSIS — Z12.5 SCREENING PSA (PROSTATE SPECIFIC ANTIGEN): ICD-10-CM

## 2020-02-03 DIAGNOSIS — G56.91 NEUROPATHY OF RIGHT HAND: ICD-10-CM

## 2020-02-03 DIAGNOSIS — Z00.00 ROUTINE GENERAL MEDICAL EXAMINATION AT A HEALTH CARE FACILITY: Primary | ICD-10-CM

## 2020-02-03 DIAGNOSIS — T78.40XA ALLERGIC STATE, INITIAL ENCOUNTER: ICD-10-CM

## 2020-02-03 DIAGNOSIS — M25.561 CHRONIC PAIN OF RIGHT KNEE: ICD-10-CM

## 2020-02-03 DIAGNOSIS — E78.00 HYPERCHOLESTEROLEMIA: ICD-10-CM

## 2020-02-03 DIAGNOSIS — G89.29 CHRONIC MIDLINE LOW BACK PAIN WITHOUT SCIATICA: ICD-10-CM

## 2020-02-03 DIAGNOSIS — M54.50 CHRONIC MIDLINE LOW BACK PAIN WITHOUT SCIATICA: ICD-10-CM

## 2020-02-03 DIAGNOSIS — I10 ESSENTIAL HYPERTENSION: ICD-10-CM

## 2020-02-03 PROBLEM — H54.8 LEGAL BLINDNESS: Status: ACTIVE | Noted: 2020-02-03

## 2020-02-03 PROBLEM — E66.9 OBESITY (BMI 30-39.9): Status: RESOLVED | Noted: 2018-04-03 | Resolved: 2020-02-03

## 2020-02-03 PROBLEM — F43.10 PTSD (POST-TRAUMATIC STRESS DISORDER): Status: ACTIVE | Noted: 2020-02-03

## 2020-02-03 PROCEDURE — 99396 PREV VISIT EST AGE 40-64: CPT | Performed by: INTERNAL MEDICINE

## 2020-02-03 RX ORDER — EPINEPHRINE 0.3 MG/.3ML
0.3 INJECTION SUBCUTANEOUS ONCE
Qty: 1 EACH | Refills: 0 | Status: SHIPPED | OUTPATIENT
Start: 2020-02-03 | End: 2020-02-03

## 2020-02-03 RX ORDER — CLONIDINE HYDROCHLORIDE 0.3 MG/1
0.3 TABLET ORAL 2 TIMES DAILY
Qty: 180 TABLET | Refills: 0 | Status: SHIPPED | OUTPATIENT
Start: 2020-02-03 | End: 2020-07-04

## 2020-02-03 NOTE — PROGRESS NOTES
Patient presents with:  CPX      HPI: Gretchen Sofia presents today for male CPX. Stable health. Still doing a lot of rehab for not only chronic vision disorder but also to improve general functionality. Has PTSD b/c of permanent and irreversible vision loss. Rfl:   •  cloNIDine HCl 0.3 MG Oral Tab, Take 1 tablet (0.3 mg total) by mouth daily. , Disp: 30 tablet, Rfl: 11  •  Albuterol Sulfate  (90 Base) MCG/ACT Inhalation Aero Soln, Inhale 1-2 puffs into the lungs every 4 to 6 hours as needed for Wheezing hypertension  Hypercholesterolemia  Allergic state, initial encounter  Chronic pain of right knee  Chronic midline low back pain without sciatica  Neuropathy of right hand    1. Male CPX - Stable health. Check wellness labs. 2. HM/Prev - Check PSA.   Push

## 2020-02-04 ENCOUNTER — TELEPHONE (OUTPATIENT)
Dept: INTERNAL MEDICINE CLINIC | Facility: CLINIC | Age: 45
End: 2020-02-04

## 2020-02-04 PROBLEM — M54.2 CHRONIC NECK AND BACK PAIN: Status: ACTIVE | Noted: 2018-04-03

## 2020-02-04 PROBLEM — H47.10 OPTIC NERVE EDEMA: Status: RESOLVED | Noted: 2018-04-03 | Resolved: 2020-02-04

## 2020-02-04 PROBLEM — G89.29 CHRONIC NECK AND BACK PAIN: Status: ACTIVE | Noted: 2018-04-03

## 2020-02-04 PROBLEM — H54.8 LEGAL BLINDNESS: Status: RESOLVED | Noted: 2020-02-03 | Resolved: 2020-02-04

## 2020-02-04 PROBLEM — M54.9 CHRONIC NECK AND BACK PAIN: Status: ACTIVE | Noted: 2018-04-03

## 2020-02-04 PROBLEM — Z98.1 S/P CERVICAL SPINAL FUSION: Status: RESOLVED | Noted: 2018-04-03 | Resolved: 2020-02-04

## 2020-02-04 NOTE — TELEPHONE ENCOUNTER
Patient dropped off form to be completed: [place] for Qualifying Patient Registry Identification Card    Placed in Dr. Jayme Arita fax folder

## 2020-02-06 ENCOUNTER — HOSPITAL ENCOUNTER (OUTPATIENT)
Dept: GENERAL RADIOLOGY | Age: 45
Discharge: HOME | End: 2020-02-06
Attending: INTERNAL MEDICINE
Payer: COMMERCIAL

## 2020-02-06 DIAGNOSIS — M25.561 CHRONIC PAIN OF RIGHT KNEE: ICD-10-CM

## 2020-02-06 DIAGNOSIS — G89.29 CHRONIC MIDLINE LOW BACK PAIN WITHOUT SCIATICA: ICD-10-CM

## 2020-02-06 DIAGNOSIS — G89.29 CHRONIC PAIN OF RIGHT KNEE: ICD-10-CM

## 2020-02-06 DIAGNOSIS — M54.50 CHRONIC MIDLINE LOW BACK PAIN WITHOUT SCIATICA: ICD-10-CM

## 2020-02-06 PROCEDURE — 72100 X-RAY EXAM L-S SPINE 2/3 VWS: CPT | Performed by: INTERNAL MEDICINE

## 2020-02-06 PROCEDURE — 73560 X-RAY EXAM OF KNEE 1 OR 2: CPT | Performed by: INTERNAL MEDICINE

## 2020-03-12 ENCOUNTER — LAB ENCOUNTER (OUTPATIENT)
Dept: LAB | Age: 45
End: 2020-03-12
Attending: INTERNAL MEDICINE
Payer: COMMERCIAL

## 2020-03-12 DIAGNOSIS — Z12.5 SCREENING PSA (PROSTATE SPECIFIC ANTIGEN): ICD-10-CM

## 2020-03-12 DIAGNOSIS — I10 ESSENTIAL HYPERTENSION: ICD-10-CM

## 2020-03-12 DIAGNOSIS — E78.00 HYPERCHOLESTEROLEMIA: ICD-10-CM

## 2020-03-12 DIAGNOSIS — Z00.00 ROUTINE GENERAL MEDICAL EXAMINATION AT A HEALTH CARE FACILITY: ICD-10-CM

## 2020-03-12 DIAGNOSIS — T78.40XA ALLERGIC STATE, INITIAL ENCOUNTER: ICD-10-CM

## 2020-03-12 LAB
ALBUMIN SERPL-MCNC: 3.7 G/DL (ref 3.4–5)
ALBUMIN/GLOB SERPL: 0.9 {RATIO} (ref 1–2)
ALP LIVER SERPL-CCNC: 49 U/L (ref 45–117)
ALT SERPL-CCNC: 32 U/L (ref 16–61)
ANION GAP SERPL CALC-SCNC: 5 MMOL/L (ref 0–18)
AST SERPL-CCNC: 16 U/L (ref 15–37)
BASOPHILS # BLD AUTO: 0.07 X10(3) UL (ref 0–0.2)
BASOPHILS NFR BLD AUTO: 0.9 %
BILIRUB SERPL-MCNC: 0.3 MG/DL (ref 0.1–2)
BUN BLD-MCNC: 14 MG/DL (ref 7–18)
BUN/CREAT SERPL: 15.6 (ref 10–20)
CALCIUM BLD-MCNC: 8.8 MG/DL (ref 8.5–10.1)
CHLORIDE SERPL-SCNC: 110 MMOL/L (ref 98–112)
CHOLEST SMN-MCNC: 242 MG/DL (ref ?–200)
CO2 SERPL-SCNC: 26 MMOL/L (ref 21–32)
COMPLEXED PSA SERPL-MCNC: 0.9 NG/ML (ref ?–4)
CREAT BLD-MCNC: 0.9 MG/DL (ref 0.7–1.3)
DEPRECATED RDW RBC AUTO: 41.6 FL (ref 35.1–46.3)
EOSINOPHIL # BLD AUTO: 0.45 X10(3) UL (ref 0–0.7)
EOSINOPHIL NFR BLD AUTO: 5.8 %
ERYTHROCYTE [DISTWIDTH] IN BLOOD BY AUTOMATED COUNT: 12.5 % (ref 11–15)
EST. AVERAGE GLUCOSE BLD GHB EST-MCNC: 111 MG/DL (ref 68–126)
GLOBULIN PLAS-MCNC: 3.9 G/DL (ref 2.8–4.4)
GLUCOSE BLD-MCNC: 103 MG/DL (ref 70–99)
HBA1C MFR BLD HPLC: 5.5 % (ref ?–5.7)
HCT VFR BLD AUTO: 46.1 % (ref 39–53)
HDLC SERPL-MCNC: 30 MG/DL (ref 40–59)
HGB BLD-MCNC: 15.9 G/DL (ref 13–17.5)
IMM GRANULOCYTES # BLD AUTO: 0.03 X10(3) UL (ref 0–1)
IMM GRANULOCYTES NFR BLD: 0.4 %
LDLC SERPL DIRECT ASSAY-MCNC: 119 MG/DL (ref ?–100)
LYMPHOCYTES # BLD AUTO: 1.46 X10(3) UL (ref 1–4)
LYMPHOCYTES NFR BLD AUTO: 18.7 %
M PROTEIN MFR SERPL ELPH: 7.6 G/DL (ref 6.4–8.2)
MCH RBC QN AUTO: 31.1 PG (ref 26–34)
MCHC RBC AUTO-ENTMCNC: 34.5 G/DL (ref 31–37)
MCV RBC AUTO: 90 FL (ref 80–100)
MONOCYTES # BLD AUTO: 0.67 X10(3) UL (ref 0.1–1)
MONOCYTES NFR BLD AUTO: 8.6 %
NEUTROPHILS # BLD AUTO: 5.14 X10 (3) UL (ref 1.5–7.7)
NEUTROPHILS # BLD AUTO: 5.14 X10(3) UL (ref 1.5–7.7)
NEUTROPHILS NFR BLD AUTO: 65.6 %
NONHDLC SERPL-MCNC: 212 MG/DL (ref ?–130)
OSMOLALITY SERPL CALC.SUM OF ELEC: 293 MOSM/KG (ref 275–295)
PATIENT FASTING Y/N/NP: YES
PATIENT FASTING Y/N/NP: YES
PLATELET # BLD AUTO: 285 10(3)UL (ref 150–450)
POTASSIUM SERPL-SCNC: 3.5 MMOL/L (ref 3.5–5.1)
RBC # BLD AUTO: 5.12 X10(6)UL (ref 4.3–5.7)
SODIUM SERPL-SCNC: 141 MMOL/L (ref 136–145)
TRIGL SERPL-MCNC: 658 MG/DL (ref 30–149)
TSI SER-ACNC: 1.29 MIU/ML (ref 0.36–3.74)
VIT D+METAB SERPL-MCNC: 12.9 NG/ML (ref 30–100)
WBC # BLD AUTO: 7.8 X10(3) UL (ref 4–11)

## 2020-03-12 PROCEDURE — 83036 HEMOGLOBIN GLYCOSYLATED A1C: CPT | Performed by: INTERNAL MEDICINE

## 2020-03-12 PROCEDURE — 86003 ALLG SPEC IGE CRUDE XTRC EA: CPT | Performed by: INTERNAL MEDICINE

## 2020-03-12 PROCEDURE — 83721 ASSAY OF BLOOD LIPOPROTEIN: CPT | Performed by: INTERNAL MEDICINE

## 2020-03-12 PROCEDURE — 80061 LIPID PANEL: CPT | Performed by: INTERNAL MEDICINE

## 2020-03-12 PROCEDURE — 80050 GENERAL HEALTH PANEL: CPT | Performed by: INTERNAL MEDICINE

## 2020-03-12 PROCEDURE — 82785 ASSAY OF IGE: CPT | Performed by: INTERNAL MEDICINE

## 2020-03-12 PROCEDURE — 82306 VITAMIN D 25 HYDROXY: CPT | Performed by: INTERNAL MEDICINE

## 2020-03-12 PROCEDURE — 84153 ASSAY OF PSA TOTAL: CPT | Performed by: INTERNAL MEDICINE

## 2020-03-12 PROCEDURE — 36415 COLL VENOUS BLD VENIPUNCTURE: CPT | Performed by: INTERNAL MEDICINE

## 2020-03-13 ENCOUNTER — APPOINTMENT (OUTPATIENT)
Dept: LAB | Age: 45
End: 2020-03-13
Attending: INTERNAL MEDICINE
Payer: COMMERCIAL

## 2020-03-13 DIAGNOSIS — T78.40XA ALLERGIC STATE, INITIAL ENCOUNTER: ICD-10-CM

## 2020-03-13 DIAGNOSIS — Z00.00 ROUTINE GENERAL MEDICAL EXAMINATION AT A HEALTH CARE FACILITY: ICD-10-CM

## 2020-03-13 DIAGNOSIS — I10 ESSENTIAL HYPERTENSION: ICD-10-CM

## 2020-03-13 DIAGNOSIS — E78.00 HYPERCHOLESTEROLEMIA: ICD-10-CM

## 2020-03-13 DIAGNOSIS — Z12.5 SCREENING PSA (PROSTATE SPECIFIC ANTIGEN): ICD-10-CM

## 2020-03-13 LAB
BILIRUB UR QL STRIP.AUTO: NEGATIVE
COLOR UR AUTO: YELLOW
GLUCOSE UR STRIP.AUTO-MCNC: NEGATIVE MG/DL
HYALINE CASTS #/AREA URNS AUTO: PRESENT /LPF
KETONES UR STRIP.AUTO-MCNC: NEGATIVE MG/DL
LEUKOCYTE ESTERASE UR QL STRIP.AUTO: NEGATIVE
NITRITE UR QL STRIP.AUTO: NEGATIVE
PH UR STRIP.AUTO: 5 [PH] (ref 4.5–8)
PROT UR STRIP.AUTO-MCNC: 100 MG/DL
RBC UR QL AUTO: NEGATIVE
SP GR UR STRIP.AUTO: 1.02 (ref 1–1.03)
UROBILINOGEN UR STRIP.AUTO-MCNC: <2 MG/DL

## 2020-03-13 PROCEDURE — 81001 URINALYSIS AUTO W/SCOPE: CPT | Performed by: INTERNAL MEDICINE

## 2020-03-16 ENCOUNTER — TELEPHONE (OUTPATIENT)
Dept: INTERNAL MEDICINE CLINIC | Facility: CLINIC | Age: 45
End: 2020-03-16

## 2020-03-16 RX ORDER — NABUMETONE 1000 MG/1
1 TABLET ORAL 2 TIMES DAILY
Qty: 60 TABLET | Refills: 2 | Status: SHIPPED | OUTPATIENT
Start: 2020-03-16 | End: 2020-04-15

## 2020-03-16 NOTE — TELEPHONE ENCOUNTER
Call patient. There is a prescription drug called Relafen DS. It's a potent once-daily anti-inflammatory medication.   It's available for free for the 1st 60 tablets, via a partnership of the drug  and a [employer] called [employer]

## 2020-03-16 NOTE — TELEPHONE ENCOUNTER
Patient is till having low back pain for the last 6 months wants to know if there is anything that can be prescribed. Pt states its hard to walk. Please advise.

## 2020-03-17 LAB
CLAM IGE QN: <0.1 KUA/L (ref ?–0.1)
CODFISH IGE QN: <0.1 KUA/L (ref ?–0.1)
CORN IGE QN: <0.1 KUA/L (ref ?–0.1)
COW MILK IGE QN: <0.1 KUA/L (ref ?–0.1)
EGG WHITE IGE QN: <0.1 KUA/L (ref ?–0.1)
IGE SERPL-ACNC: 106 KU/L (ref 2–214)
PEANUT IGE QN: <0.1 KUA/L (ref ?–0.1)
SCALLOP IGE QN: 0.39 KUA/L (ref ?–0.1)
SESAME SEED IGE QN: <0.1 KUA/L (ref ?–0.1)
SHRIMP IGE QN: 1.47 KUA/L (ref ?–0.1)
SOYBEAN IGE QN: <0.1 KUA/L (ref ?–0.1)
WALNUT IGE QN: <0.1 KUA/L (ref ?–0.1)
WHEAT IGE QN: 0.12 KUA/L (ref ?–0.1)

## 2020-03-18 LAB
A ALTERNATA IGE QN: 0.16 KUA/L (ref ?–0.1)
A FUMIGATUS IGE QN: <0.1 KUA/L (ref ?–0.1)
AMER SYCAMORE IGE QN: <0.1 KUA/L (ref ?–0.1)
BERMUDA GRASS IGE QN: 0.21 KUA/L (ref ?–0.1)
BOXELDER IGE QN: <0.1 KUA/L (ref ?–0.1)
C HERBARUM IGE QN: 0.68 KUA/L (ref ?–0.1)
CALIF WALNUT IGE QN: <0.1 KUA/L (ref ?–0.1)
CAT DANDER IGE QN: 0.27 KUA/L (ref ?–0.1)
CMN PIGWEED IGE QN: <0.1 KUA/L (ref ?–0.1)
COMMON RAGWEED IGE QN: 1.43 KUA/L (ref ?–0.1)
COTTONWOOD IGE QN: <0.1 KUA/L (ref ?–0.1)
D FARINAE IGE QN: <0.1 KUA/L (ref ?–0.1)
D PTERONYSS IGE QN: <0.1 KUA/L (ref ?–0.1)
DOG DANDER IGE QN: <0.1 KUA/L (ref ?–0.1)
IGE SERPL-ACNC: 102 KU/L (ref 2–214)
M RACEMOSUS IGE QN: <0.1 KUA/L (ref ?–0.1)
MARSH ELDER IGE QN: 0.28 KUA/L (ref ?–0.1)
MOUSE EPITH IGE QN: <0.1 KUA/L (ref ?–0.1)
MT JUNIPER IGE QN: <0.1 KUA/L (ref ?–0.1)
P NOTATUM IGE QN: <0.1 KUA/L (ref ?–0.1)
PECAN/HICK TREE IGE QN: 0.11 KUA/L (ref ?–0.1)
ROACH IGE QN: <0.1 KUA/L (ref ?–0.1)
SALTWORT IGE QN: <0.1 KUA/L (ref ?–0.1)
TIMOTHY IGE QN: 0.9 KUA/L (ref ?–0.1)
WHITE ASH IGE QN: <0.1 KUA/L (ref ?–0.1)
WHITE ELM IGE QN: 0.35 KUA/L (ref ?–0.1)
WHITE MULBERRY IGE QN: <0.1 KUA/L (ref ?–0.1)
WHITE OAK IGE QN: <0.1 KUA/L (ref ?–0.1)

## 2020-03-25 ENCOUNTER — TELEPHONE (OUTPATIENT)
Dept: INTERNAL MEDICINE CLINIC | Facility: CLINIC | Age: 45
End: 2020-03-25

## 2020-03-25 DIAGNOSIS — R79.89 LOW SERUM VITAMIN D: ICD-10-CM

## 2020-03-25 DIAGNOSIS — E78.00 HYPERCHOLESTEROLEMIA: Primary | ICD-10-CM

## 2020-03-25 RX ORDER — ATORVASTATIN CALCIUM 20 MG/1
20 TABLET, FILM COATED ORAL NIGHTLY
Qty: 90 TABLET | Refills: 3 | Status: SHIPPED | OUTPATIENT
Start: 2020-03-25 | End: 2020-08-25

## 2020-03-25 RX ORDER — ERGOCALCIFEROL 1.25 MG/1
50000 CAPSULE ORAL WEEKLY
Qty: 12 CAPSULE | Refills: 0 | Status: SHIPPED | OUTPATIENT
Start: 2020-03-25 | End: 2020-08-25 | Stop reason: ALTCHOICE

## 2020-03-25 NOTE — TELEPHONE ENCOUNTER
----- Message from Cecilio Hsu MD sent at 3/20/2020 12:44 PM CDT -----  Tell him that labs showed the followin. Low Vitamin D - Please order and pend Vitamin D 50,000 IU capsules, 1 capsule PO q week, dispense quantity #12 w/ #0 refills.   Please order

## 2020-04-20 ENCOUNTER — TELEPHONE (OUTPATIENT)
Dept: INTERNAL MEDICINE CLINIC | Facility: CLINIC | Age: 45
End: 2020-04-20

## 2020-04-20 RX ORDER — AMLODIPINE BESYLATE 5 MG/1
TABLET ORAL
Qty: 90 TABLET | Refills: 0 | Status: SHIPPED | OUTPATIENT
Start: 2020-04-20 | End: 2020-08-25

## 2020-04-20 NOTE — TELEPHONE ENCOUNTER
Patient was prescribed Relafen DS to help with his back pain. Patient stated that it's not helping. He would like something else to help with the [facility] [address] [facility], [address] [place] [address] [phone], [phone]. Please advise.

## 2020-04-20 NOTE — TELEPHONE ENCOUNTER
Please contact the patient and tell him/her that I would like to do a telephone visit or video visit (preferred option if he/she has MyChart) for his back pain. This is a virtual visit and is similar to being seen in the office.  The overwhelming majority o

## 2020-04-21 ENCOUNTER — VIRTUAL PHONE E/M (OUTPATIENT)
Dept: INTERNAL MEDICINE CLINIC | Facility: CLINIC | Age: 45
End: 2020-04-21
Payer: COMMERCIAL

## 2020-04-21 ENCOUNTER — TELEPHONE (OUTPATIENT)
Dept: INTERNAL MEDICINE CLINIC | Facility: CLINIC | Age: 45
End: 2020-04-21

## 2020-04-21 DIAGNOSIS — M54.50 CHRONIC LEFT-SIDED LOW BACK PAIN WITHOUT SCIATICA: Primary | ICD-10-CM

## 2020-04-21 DIAGNOSIS — G89.29 CHRONIC LEFT-SIDED LOW BACK PAIN WITHOUT SCIATICA: Primary | ICD-10-CM

## 2020-04-21 DIAGNOSIS — M54.16 CHRONIC LEFT LUMBAR RADICULOPATHY: ICD-10-CM

## 2020-04-21 PROCEDURE — 99214 OFFICE O/P EST MOD 30 MIN: CPT | Performed by: INTERNAL MEDICINE

## 2020-04-21 RX ORDER — CYCLOBENZAPRINE HCL 10 MG
10 TABLET ORAL 3 TIMES DAILY PRN
Qty: 30 TABLET | Refills: 0 | Status: SHIPPED | OUTPATIENT
Start: 2020-04-21 | End: 2020-05-18

## 2020-04-21 RX ORDER — METHYLPREDNISOLONE 4 MG/1
TABLET ORAL
Qty: 1 KIT | Refills: 0 | Status: SHIPPED | OUTPATIENT
Start: 2020-04-21 | End: 2020-05-18

## 2020-04-21 NOTE — TELEPHONE ENCOUNTER
Appointment scheduled    Future Appointments   Date Time Provider Clement Paez   4/21/2020 11:30 AM Jessica Davila MD [facility] [place]     Patient gave verbal consent for virtual telephone visit. Insurance verified.

## 2020-04-21 NOTE — TELEPHONE ENCOUNTER
[4/21/2020 12:01 PM]  Roosevelt Vega:    Lucio Li, I just sent a message to clinical about getting the patient Monica Castaneda the contact info for [address] [facility] [address] over in [place]. Can you please take care of this? Thank you.

## 2020-04-21 NOTE — PROGRESS NOTES
Virtual Telephone Check-In    Brittney Hermosillo verbally consents to a Virtual/Telephone Check-In visit on 04/21/20. Patient understands and accepts financial responsibility for any deductible, co-insurance and/or co-pays associated with this service. losartan 100 MG Oral Tab, Take 1 tablet (100 mg total) by mouth daily. , Disp: 90 tablet, Rfl: 0  •  hydrochlorothiazide 25 MG Oral Tab, Take 1 tablet (25 mg total) by mouth daily. , Disp: 90 tablet, Rfl: 0  •  FOLIC ACID 1 MG Oral Tab, TAKE 1 TABLET BY MOUT

## 2020-04-22 ENCOUNTER — TELEPHONE (OUTPATIENT)
Dept: INTERNAL MEDICINE CLINIC | Facility: CLINIC | Age: 45
End: 2020-04-22

## 2020-04-22 DIAGNOSIS — G89.29 CHRONIC NECK AND BACK PAIN: Primary | ICD-10-CM

## 2020-04-22 DIAGNOSIS — M54.9 CHRONIC NECK AND BACK PAIN: Primary | ICD-10-CM

## 2020-04-22 DIAGNOSIS — M54.2 CHRONIC NECK AND BACK PAIN: Primary | ICD-10-CM

## 2020-04-22 NOTE — TELEPHONE ENCOUNTER
Patient called stating that Dr. Luigi Crespo ordered MRI for him.  He scheduled appointment for tomorrow, however radiology tech notified him that it had to be cancelled due to insurance denial. Advised patient to call our office to get different coding on the MRI

## 2020-04-22 NOTE — TELEPHONE ENCOUNTER
It is probably because insurance does not have any clinical documentation. Please have my clinical notes sent their way and have them process the case again.  If they deny it after this, then patient will have to pay cash for the MRI which I believe is $375

## 2020-04-23 NOTE — TELEPHONE ENCOUNTER
Pt aware mri denied by insurance. Pt stated unable to pay out of pocket and would like to know if there is any other alternative for image. Please advise.

## 2020-04-23 NOTE — TELEPHONE ENCOUNTER
At this point, I will recommend an assessment with a back specialist. I would have him see Physiatry, Dr. Mere Stewart from [facility]. Referral placed. Please get him contact info. Samson Lynn.  Erasmo Handley MD  Diplomate, [address] [place] [address] Board of Internal Medicine  Member, Am

## 2020-04-23 NOTE — TELEPHONE ENCOUNTER
Per Ira with insight, all clinical documentation was provided to insurance company already and MRI was denied. Would you like me to advise pt that he can pay OOP?

## 2020-04-23 NOTE — TELEPHONE ENCOUNTER
Yes. Thanks. Moraima Wahl. Neeta Hensley MD  Diplomate, American Board of Internal Medicine  Member, [employer] of [address] [employer]  [address]  [facility], [address], [address], [address]  T: [id number]; F: [address]

## 2020-04-24 NOTE — TELEPHONE ENCOUNTER
Spoke with patient informing of MG's message below, Dr. Mary Cowan given as well. Patient verbalized understanding with no further questions or concerns.

## 2020-04-28 RX ORDER — FOLIC ACID 1 MG/1
TABLET ORAL
Qty: 90 TABLET | Refills: 0 | Status: SHIPPED | OUTPATIENT
Start: 2020-04-28 | End: 2020-08-06

## 2020-05-18 ENCOUNTER — VIRTUAL PHONE E/M (OUTPATIENT)
Dept: INTERNAL MEDICINE CLINIC | Facility: CLINIC | Age: 45
End: 2020-05-18
Payer: COMMERCIAL

## 2020-05-18 DIAGNOSIS — M54.50 CHRONIC LEFT-SIDED LOW BACK PAIN WITHOUT SCIATICA: ICD-10-CM

## 2020-05-18 DIAGNOSIS — M54.2 CHRONIC NECK AND BACK PAIN: Primary | ICD-10-CM

## 2020-05-18 DIAGNOSIS — M54.16 CHRONIC LEFT LUMBAR RADICULOPATHY: ICD-10-CM

## 2020-05-18 DIAGNOSIS — G89.29 CHRONIC LEFT-SIDED LOW BACK PAIN WITHOUT SCIATICA: ICD-10-CM

## 2020-05-18 DIAGNOSIS — M54.9 CHRONIC NECK AND BACK PAIN: Primary | ICD-10-CM

## 2020-05-18 DIAGNOSIS — G89.29 CHRONIC NECK AND BACK PAIN: Primary | ICD-10-CM

## 2020-05-18 PROCEDURE — 99213 OFFICE O/P EST LOW 20 MIN: CPT | Performed by: INTERNAL MEDICINE

## 2020-05-18 NOTE — PROGRESS NOTES
Virtual Telephone Check-In    Donna Marshall verbally consents to a Virtual/Telephone Check-In visit on 05/18/20. Patient understands and accepts financial responsibility for any deductible, co-insurance and/or co-pays associated with this service. 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  3. RTC 3 months for HTN f/u. Chris verbally agrees to and understands the plan as outlined above. He was also afforded the time and opportunity to ask questions, which were then answered to the best of my ability. Ruslan Ruelas.  Dale Vee MD  [address] [place] [address]

## 2020-05-19 ENCOUNTER — OFFICE VISIT (OUTPATIENT)
Dept: PHYSICAL THERAPY | Age: 45
End: 2020-05-19
Attending: INTERNAL MEDICINE
Payer: COMMERCIAL

## 2020-05-19 PROCEDURE — 97162 PT EVAL MOD COMPLEX 30 MIN: CPT

## 2020-05-19 PROCEDURE — 97112 NEUROMUSCULAR REEDUCATION: CPT

## 2020-05-19 NOTE — PROGRESS NOTES
LUMBAR EVALUATION:   Referring Physician: Dr. Jenny Marrero  Diagnosis: Chronic neck and back pain  Chronic left-sided low back pain without sciatica   Chronic left lumbar radiculopathy     Date of Service: 5/19/2020     PATIENT SUMMARY   Izella Boas is a 38 y leading to functional deficits. Functional deficits include but are not limited to standing time, twisting and supine to sit in the morning. Pt and PT discuss HEP, pathology, POC and PT findings.   Pt voiced understanding and performs HEP correctly withou a HEP for prone scap squeeze 5sx10, prone T's 2x10, bridges with core activation 2x10, pelvic clock 1min x2  Charges: PT Eval Moderate Complexity, neuro x1      Total Timed Treatment: 15 min     Total Treatment Time: 60 min       In agreement with FOTO sco

## 2020-05-22 ENCOUNTER — APPOINTMENT (OUTPATIENT)
Dept: PHYSICAL THERAPY | Age: 45
End: 2020-05-22
Attending: INTERNAL MEDICINE
Payer: COMMERCIAL

## 2020-05-26 ENCOUNTER — OFFICE VISIT (OUTPATIENT)
Dept: PHYSICAL THERAPY | Age: 45
End: 2020-05-26
Attending: INTERNAL MEDICINE
Payer: COMMERCIAL

## 2020-05-26 PROCEDURE — 97140 MANUAL THERAPY 1/> REGIONS: CPT

## 2020-05-26 PROCEDURE — 97112 NEUROMUSCULAR REEDUCATION: CPT

## 2020-05-26 NOTE — PROGRESS NOTES
Dx: Chronic neck and back pain  Chronic left-sided low back pain without sciatica   Chronic left lumbar radiculopathy             Insurance (Authorized # of Visits):  Cristobal Thornton Physician: Dr. Preeti Murdock  Next MD visit: none scheduled  Fall Risk traction  UT STM  CT upglide  Thoracic PA        HEP: prone scap squeeze 5sx10, prone T's 2x10, bridges with core activation 2x10, pelvic clock 1min x2    Charges: manual x1, neuro x2      Total Timed Treatment: 45 min  Total Treatment Time: 45 min

## 2020-05-29 ENCOUNTER — APPOINTMENT (OUTPATIENT)
Dept: PHYSICAL THERAPY | Age: 45
End: 2020-05-29
Attending: INTERNAL MEDICINE
Payer: COMMERCIAL

## 2020-06-02 ENCOUNTER — APPOINTMENT (OUTPATIENT)
Dept: PHYSICAL THERAPY | Age: 45
End: 2020-06-02
Attending: INTERNAL MEDICINE
Payer: COMMERCIAL

## 2020-06-05 ENCOUNTER — APPOINTMENT (OUTPATIENT)
Dept: PHYSICAL THERAPY | Age: 45
End: 2020-06-05
Attending: INTERNAL MEDICINE
Payer: COMMERCIAL

## 2020-06-09 ENCOUNTER — APPOINTMENT (OUTPATIENT)
Dept: PHYSICAL THERAPY | Age: 45
End: 2020-06-09
Attending: INTERNAL MEDICINE
Payer: COMMERCIAL

## 2020-06-12 ENCOUNTER — APPOINTMENT (OUTPATIENT)
Dept: PHYSICAL THERAPY | Age: 45
End: 2020-06-12
Attending: INTERNAL MEDICINE
Payer: COMMERCIAL

## 2020-06-16 ENCOUNTER — APPOINTMENT (OUTPATIENT)
Dept: PHYSICAL THERAPY | Age: 45
End: 2020-06-16
Attending: INTERNAL MEDICINE
Payer: COMMERCIAL

## 2020-06-19 ENCOUNTER — OFFICE VISIT (OUTPATIENT)
Dept: PHYSICAL MEDICINE AND REHAB | Facility: CLINIC | Age: 45
End: 2020-06-19
Payer: COMMERCIAL

## 2020-06-19 VITALS — BODY MASS INDEX: 22.73 KG/M2 | HEIGHT: 68 IN | WEIGHT: 150 LBS

## 2020-06-19 DIAGNOSIS — M54.42 CHRONIC LEFT-SIDED LOW BACK PAIN WITH LEFT-SIDED SCIATICA: Primary | ICD-10-CM

## 2020-06-19 DIAGNOSIS — G89.29 CHRONIC LEFT-SIDED LOW BACK PAIN WITH LEFT-SIDED SCIATICA: Primary | ICD-10-CM

## 2020-06-19 PROCEDURE — 99244 OFF/OP CNSLTJ NEW/EST MOD 40: CPT | Performed by: PHYSICAL MEDICINE & REHABILITATION

## 2020-06-19 RX ORDER — MELOXICAM 15 MG/1
TABLET ORAL
Qty: 30 TABLET | Refills: 0 | Status: SHIPPED | OUTPATIENT
Start: 2020-06-19 | End: 2020-08-14

## 2020-06-19 RX ORDER — EPINEPHRINE 0.3 MG/.3ML
INJECTION SUBCUTANEOUS
COMMUNITY
Start: 2020-02-03

## 2020-06-19 RX ORDER — LOSARTAN POTASSIUM AND HYDROCHLOROTHIAZIDE 25; 100 MG/1; MG/1
TABLET ORAL
COMMUNITY
Start: 2020-06-08 | End: 2020-08-20

## 2020-06-19 NOTE — H&P
[facility], [address] [place] [address]    History and Physical    Kathy Beltran Patient Status:  No patient class for patient encounter    1975 MRN [id number]   Location ED [facility], [address] [place] [address], [address] [place] [address] Attending No att. unknown   • Diabetes Mother    • Lipids Mother    • Hypertension Mother      Social History:  Social History    Tobacco Use      Smoking status: Former Smoker        Packs/day: 1.00        Years: 24.00        Pack years: 24        Types: Cigarettes lumbar paraspinals. Provocative tests: Left supine straight leg raise positive for left lower back pain. Right supine straight leg raise negative. Seated slump test positive for left-sided lower back pain. Sidney negative bilaterally.    Neurological:

## 2020-06-19 NOTE — PROGRESS NOTES
PHYSICAL EXAM:   Physical Exam   Constitutional:           Patient presents in office today with reported pain in lower back down the left leg  patient states having pain shoot up his left side,     Current pain level reported = 7/10    Location of Pain:

## 2020-06-24 ENCOUNTER — MED REC SCAN ONLY (OUTPATIENT)
Dept: PAIN CLINIC | Facility: CLINIC | Age: 45
End: 2020-06-24

## 2020-07-02 DIAGNOSIS — I10 ESSENTIAL HYPERTENSION: ICD-10-CM

## 2020-07-03 NOTE — TELEPHONE ENCOUNTER
LOV: 5/18/2020 with Dr. Michael Vargas  RTC: 3 months  Last Relevant Labs: 3/12/2020   Filled: 2/3/2020  #180 with 0 refills    Future Appointments   Date Time Provider Clement Kwongi   8/25/2020  2:30 PM Óscar Madrigal MD [facility] [place]

## 2020-07-04 RX ORDER — CLONIDINE HYDROCHLORIDE 0.3 MG/1
TABLET ORAL
Qty: 180 TABLET | Refills: 0 | Status: SHIPPED | OUTPATIENT
Start: 2020-07-04 | End: 2020-08-25

## 2020-07-29 ENCOUNTER — TELEPHONE (OUTPATIENT)
Dept: INTERNAL MEDICINE CLINIC | Facility: CLINIC | Age: 45
End: 2020-07-29

## 2020-07-29 RX ORDER — ALPRAZOLAM 0.25 MG/1
0.25 TABLET ORAL ONCE
Qty: 2 TABLET | Refills: 0 | Status: SHIPPED | OUTPATIENT
Start: 2020-07-29 | End: 2020-07-29

## 2020-07-29 NOTE — TELEPHONE ENCOUNTER
[facility]

## 2020-07-29 NOTE — TELEPHONE ENCOUNTER
Patient is having an MRI tomorrow 4pm and would like medication (just 1 day dose) sent to [address] [facility], [facility] [phone], [phone]      Please request for this to be sent for patient by end of day today please

## 2020-07-29 NOTE — TELEPHONE ENCOUNTER
Pt requesting medication to calm him prior to MRI tomorrow. Pt informed he will need a ride to and from imaging. Verbalizes understanding.

## 2020-07-30 ENCOUNTER — HOSPITAL ENCOUNTER (OUTPATIENT)
Dept: MRI IMAGING | Age: 45
Discharge: HOME | End: 2020-07-30
Attending: PHYSICAL MEDICINE & REHABILITATION
Payer: COMMERCIAL

## 2020-07-30 DIAGNOSIS — M54.42 CHRONIC LEFT-SIDED LOW BACK PAIN WITH LEFT-SIDED SCIATICA: ICD-10-CM

## 2020-07-30 DIAGNOSIS — G89.29 CHRONIC LEFT-SIDED LOW BACK PAIN WITH LEFT-SIDED SCIATICA: ICD-10-CM

## 2020-07-30 PROCEDURE — 72148 MRI LUMBAR SPINE W/O DYE: CPT | Performed by: PHYSICAL MEDICINE & REHABILITATION

## 2020-08-03 ENCOUNTER — TELEPHONE (OUTPATIENT)
Dept: NEUROLOGY | Facility: CLINIC | Age: 45
End: 2020-08-03

## 2020-08-03 NOTE — TELEPHONE ENCOUNTER
Okay thanks.   It looks like Dr. Amos Pearson saw it  and sent recommendations back to the patient on 7/31

## 2020-08-05 DIAGNOSIS — G89.29 CHRONIC LEFT-SIDED LOW BACK PAIN WITH LEFT-SIDED SCIATICA: ICD-10-CM

## 2020-08-05 DIAGNOSIS — M54.42 CHRONIC LEFT-SIDED LOW BACK PAIN WITH LEFT-SIDED SCIATICA: ICD-10-CM

## 2020-08-05 NOTE — TELEPHONE ENCOUNTER
Medication: Meloxicam 15 MG Oral Tab    Date of last refill: 6/19/2020  [place]    Last office visit: 6/19/2020  Due back to clinic per last office note:  He was instructed to follow-up after he has had the MRI of th
pt needs refill of antiinflammatory med, begins with \"m\" (could be meloxicam per patient)  [facility] [address]   Patient informed of 48 hour refill policy excluding weekends and holidays.  Informed patient only patient can  the prescriptio
(4) excellent

## 2020-08-06 RX ORDER — FOLIC ACID 1 MG/1
TABLET ORAL
Qty: 90 TABLET | Refills: 0 | Status: SHIPPED | OUTPATIENT
Start: 2020-08-06 | End: 2020-11-12

## 2020-08-06 NOTE — TELEPHONE ENCOUNTER
Failed protocol     Last refill:  8/46/5085 Folic Acid 1 mg #31 NR      5/28/2020 Virtual appt Dr Jeanne Honeycutt RTC 3 months  Fov scheduled 8/25/2020

## 2020-08-11 ENCOUNTER — TELEPHONE (OUTPATIENT)
Dept: PAIN CLINIC | Facility: CLINIC | Age: 45
End: 2020-08-11

## 2020-08-11 NOTE — TELEPHONE ENCOUNTER
----- Message from Maria Guadalupe Lamar DO sent at 7/31/2020  4:46 PM CDT -----  Please let the patient know that he has a mild disc bulge at 1 of the lower levels of the lower back.   This may not be the cause of his symptoms; he should follow-up for reexaminati

## 2020-08-13 RX ORDER — MELOXICAM 15 MG/1
TABLET ORAL
Qty: 30 TABLET | Refills: 0 | OUTPATIENT
Start: 2020-08-13

## 2020-08-14 ENCOUNTER — TELEPHONE (OUTPATIENT)
Dept: PAIN CLINIC | Facility: CLINIC | Age: 45
End: 2020-08-14

## 2020-08-14 ENCOUNTER — OFFICE VISIT (OUTPATIENT)
Dept: PHYSICAL MEDICINE AND REHAB | Facility: CLINIC | Age: 45
End: 2020-08-14
Payer: COMMERCIAL

## 2020-08-14 DIAGNOSIS — M47.816 LUMBAR FACET ARTHROPATHY: Primary | ICD-10-CM

## 2020-08-14 DIAGNOSIS — G89.29 CHRONIC LEFT-SIDED LOW BACK PAIN WITH LEFT-SIDED SCIATICA: ICD-10-CM

## 2020-08-14 DIAGNOSIS — M54.42 CHRONIC LEFT-SIDED LOW BACK PAIN WITH LEFT-SIDED SCIATICA: ICD-10-CM

## 2020-08-14 PROCEDURE — 99214 OFFICE O/P EST MOD 30 MIN: CPT | Performed by: PHYSICAL MEDICINE & REHABILITATION

## 2020-08-14 RX ORDER — MELOXICAM 15 MG/1
15 TABLET ORAL DAILY
Qty: 30 TABLET | Refills: 0 | Status: SHIPPED | OUTPATIENT
Start: 2020-08-14 | End: 2020-12-22 | Stop reason: ALTCHOICE

## 2020-08-14 NOTE — TELEPHONE ENCOUNTER
Patient recommended for facet injection with Dr. Caty Vincent. Patient currently taking ASA 325mg managed by Dr. Trinidad Panda.  Letter of medical clearance sent to Dr. Trinidad Panda requesting approval for patient to hold ASA 325mg prior to recommended Dr. Caty Vincent would like a

## 2020-08-14 NOTE — PROGRESS NOTES
Spoke to patient regarding recommended facet injection procedure and reviewed all relevant pre-procedure instructions.   Patient understands procedure will be scheduled upon receipt of insurance approval and medical clearance    Patient elects to undergo pr

## 2020-08-14 NOTE — TELEPHONE ENCOUNTER
Medical clearance needed- YES / Dr. Trinidad Panda (pt taking ASA 325mg)     Implanted cardiac device/SCS/PNS: NA    Pt seen in OV today by Dr. Caty Vincent and recommended for facet (X 1). Please begin PA process for procedure(s).      Laterality: Left  Level(s): L4-5,

## 2020-08-14 NOTE — TELEPHONE ENCOUNTER
Dear Dr. Rockne Najjar    Your patient is being scheduled for a pain management procedure at [facility] within the next 4 weeks.     Procedure: Left Facet joint injection L4-5, L5-S1  Physician: Emeli Valdez- Anesthesiologist     Your patient is currently

## 2020-08-14 NOTE — PROGRESS NOTES
HPI:    Patient ID: Trupti Beverly is a 54year old male. 21-year-old male presents for follow-up, and to review MRI results of the lumbar spine.   He continues to have right-sided lower back pain at and just below the waistline with intermittent numbne (100 mg total) by mouth daily. 90 tablet 0   • hydrochlorothiazide 25 MG Oral Tab Take 1 tablet (25 mg total) by mouth daily.  90 tablet 0   • Albuterol Sulfate  (90 Base) MCG/ACT Inhalation Aero Soln Inhale 1-2 puffs into the lungs every 4 to 6 hour visible mass. BONY STRUCTURES:  No acute osseous abnormalities are noted. No lytic, blastic or destructive osseous changes. There are mild to moderate bilateral facet arthritic changes noted at L4-5, as described above. No lumbar spondylolysis.   CORD

## 2020-08-14 NOTE — PATIENT INSTRUCTIONS
We will reach out to Dr. Willi Izaguirre to see if we can either lower the dose of the aspirin to 81mg 1 week before procedure, or discontinue it completely 1 week before procedure.  Do not make any changes to your aspirin until you are instructed to do so by either

## 2020-08-17 NOTE — TELEPHONE ENCOUNTER
Prior authorization request completed for: Left L4-5,L5-S1 FACET   Authorization #[id number]    Authorization dates: 08/17/20 - 10/16/20  CPT codes approved: 47775,83475  Number of visits/dates of service approved: 1  Physician: Maria Guadalupe Lamar     Patient c

## 2020-08-18 ENCOUNTER — TELEPHONE (OUTPATIENT)
Dept: INTERNAL MEDICINE CLINIC | Facility: CLINIC | Age: 45
End: 2020-08-18

## 2020-08-18 NOTE — TELEPHONE ENCOUNTER
Procedure: Left Facet joint injection L4-5, L5-S1  Physician: Dr. Hayley Talavera pre-op clearance approval to #[id number]. Signed and dated by Dr. Rg Pablo 8/18/2020. Original to scan, copy to blue accordion.

## 2020-08-19 NOTE — TELEPHONE ENCOUNTER
Please tell them YES it's fine to hold the ASA 24 hours prior to injection. Stephanie Youssef. Silke Christensen MD  Diplomate, American Board of Internal Medicine  Member, [employer] of [address] [employer]  [address], [address], [facility]

## 2020-08-20 RX ORDER — LOSARTAN POTASSIUM AND HYDROCHLOROTHIAZIDE 25; 100 MG/1; MG/1
TABLET ORAL
Qty: 90 TABLET | Refills: 1 | Status: SHIPPED | OUTPATIENT
Start: 2020-08-20 | End: 2021-03-19

## 2020-08-20 NOTE — TELEPHONE ENCOUNTER
Clarified w/ Dr. Erasmo Handley as Dr. Jocelyn Ordonez had recommended pt to be off  x 7 days and in place take ASA 81 mg and hold that for 24 hours prior. Per TO w/ Dr. Erasmo Handley he is agreeable for pt to do that. VO provided to [facility] w/ Dr. Boggs [facility] office.  No fur

## 2020-08-20 NOTE — TELEPHONE ENCOUNTER
Passed protocol     Last refill:  11/22/2019 Losartan 100 mg #90 NR  11/22/2019 HCTZ 25 mg #90 NR    5/18/2020 Virtual Phone RTC 3 months  FOV scheduled 8/25/2020

## 2020-08-20 NOTE — TELEPHONE ENCOUNTER
Received letter of medical clearance from Dr. Mily Myers office approving patient to hold ASA 325mg for 7 days prior to procedure. Letter sent to scan    Received VO from Emeka Schultz RN for Dr. Abelino Reyna, regarding clarification of ASA 81mg. See note below.

## 2020-08-20 NOTE — TELEPHONE ENCOUNTER
Spoke to patient to advise on medical clearance and ability to schedule. Patient scheduled for a pain management procedure, pre-procedure instructions reviewed. Patient advised of new process for \"virtual check-in\".  Patient advised to call Outpatient ? Check in at [facility] ([address] [place] [address]. [address] [place] [address] [place] outpatient registration in the [facility]. ? Please note-No prescriptions will be written by Pain Clinic in [place] on the day of procedure.  If you require a refill of medications, please c Most insurances are now requiring a preauthorization for all procedures. In the event that your insurance does not authorize your procedure within 48 hours of the scheduled date, your procedure will be cancelled and rescheduled to a later date.   Please c

## 2020-08-25 ENCOUNTER — OFFICE VISIT (OUTPATIENT)
Dept: INTERNAL MEDICINE CLINIC | Facility: CLINIC | Age: 45
End: 2020-08-25
Payer: COMMERCIAL

## 2020-08-25 VITALS
OXYGEN SATURATION: 98 % | RESPIRATION RATE: 16 BRPM | WEIGHT: 191 LBS | SYSTOLIC BLOOD PRESSURE: 168 MMHG | HEIGHT: 67.5 IN | BODY MASS INDEX: 29.63 KG/M2 | HEART RATE: 82 BPM | DIASTOLIC BLOOD PRESSURE: 102 MMHG | TEMPERATURE: 99 F

## 2020-08-25 DIAGNOSIS — E66.3 PATIENT OVERWEIGHT: ICD-10-CM

## 2020-08-25 DIAGNOSIS — I10 ESSENTIAL HYPERTENSION: Primary | ICD-10-CM

## 2020-08-25 DIAGNOSIS — E78.00 HYPERCHOLESTEROLEMIA: ICD-10-CM

## 2020-08-25 PROCEDURE — 99214 OFFICE O/P EST MOD 30 MIN: CPT | Performed by: INTERNAL MEDICINE

## 2020-08-25 PROCEDURE — 3080F DIAST BP >= 90 MM HG: CPT | Performed by: INTERNAL MEDICINE

## 2020-08-25 PROCEDURE — 3008F BODY MASS INDEX DOCD: CPT | Performed by: INTERNAL MEDICINE

## 2020-08-25 PROCEDURE — 3077F SYST BP >= 140 MM HG: CPT | Performed by: INTERNAL MEDICINE

## 2020-08-25 RX ORDER — ATORVASTATIN CALCIUM 20 MG/1
20 TABLET, FILM COATED ORAL NIGHTLY
Qty: 90 TABLET | Refills: 1 | Status: SHIPPED | OUTPATIENT
Start: 2020-08-25 | End: 2021-08-20

## 2020-08-25 RX ORDER — AMLODIPINE BESYLATE 5 MG/1
5 TABLET ORAL DAILY
Qty: 90 TABLET | Refills: 1 | Status: SHIPPED | OUTPATIENT
Start: 2020-08-25 | End: 2020-10-09 | Stop reason: DRUGHIGH

## 2020-08-25 RX ORDER — CLONIDINE HYDROCHLORIDE 0.3 MG/1
0.3 TABLET ORAL 2 TIMES DAILY
Qty: 180 TABLET | Refills: 1 | Status: SHIPPED | OUTPATIENT
Start: 2020-08-25 | End: 2020-10-10 | Stop reason: DRUGHIGH

## 2020-08-25 NOTE — PROGRESS NOTES
Patient presents with:  Blood Pressure: 3-month follow-up      HPI: Marysol Estrada presents today for 3-month f/u HTN. He's also here for Hypercholesterolemia and weight gain. His BP meds may've gotten a bit confused since the last visit.  For some reason, the de la cruz Albuterol Sulfate  (90 Base) MCG/ACT Inhalation Aero Soln, Inhale 1-2 puffs into the lungs every 4 to 6 hours as needed for Wheezing or Shortness of Breath., Disp: , Rfl:   •  aspirin 325 MG Oral Tab, Take 1 tablet (325 mg total) by mouth daily. Starlene Pill best of my ability. [facility]. Matt Jovel MD  Diplomate, American Board of Internal Medicine  Member, [employer] College of [address] [facility]  [address]  [address] [facility], [address], [facility], [address] [place] [address]  T: [id number]; F: [phone]     Order

## 2020-09-04 ENCOUNTER — HOSPITAL ENCOUNTER (OUTPATIENT)
Facility: HOSPITAL | Age: 45
Setting detail: HOSPITAL OUTPATIENT SURGERY
Discharge: HOME | End: 2020-09-04
Attending: PHYSICAL MEDICINE & REHABILITATION | Admitting: PHYSICAL MEDICINE & REHABILITATION
Payer: COMMERCIAL

## 2020-09-04 ENCOUNTER — APPOINTMENT (OUTPATIENT)
Dept: GENERAL RADIOLOGY | Facility: HOSPITAL | Age: 45
End: 2020-09-04
Attending: PHYSICAL MEDICINE & REHABILITATION
Payer: COMMERCIAL

## 2020-09-04 VITALS
OXYGEN SATURATION: 99 % | RESPIRATION RATE: 18 BRPM | DIASTOLIC BLOOD PRESSURE: 88 MMHG | SYSTOLIC BLOOD PRESSURE: 161 MMHG | TEMPERATURE: 98 F | HEART RATE: 57 BPM

## 2020-09-04 DIAGNOSIS — M47.816 LUMBAR FACET ARTHROPATHY: ICD-10-CM

## 2020-09-04 PROCEDURE — 3E0U33Z INTRODUCTION OF ANTI-INFLAMMATORY INTO JOINTS, PERCUTANEOUS APPROACH: ICD-10-PCS | Performed by: PHYSICAL MEDICINE & REHABILITATION

## 2020-09-04 PROCEDURE — 3E0U3BZ INTRODUCTION OF ANESTHETIC AGENT INTO JOINTS, PERCUTANEOUS APPROACH: ICD-10-PCS | Performed by: PHYSICAL MEDICINE & REHABILITATION

## 2020-09-04 PROCEDURE — 64493 INJ PARAVERT F JNT L/S 1 LEV: CPT | Performed by: PHYSICAL MEDICINE & REHABILITATION

## 2020-09-04 PROCEDURE — 64494 INJ PARAVERT F JNT L/S 2 LEV: CPT | Performed by: PHYSICAL MEDICINE & REHABILITATION

## 2020-09-04 RX ORDER — METHYLPREDNISOLONE ACETATE 40 MG/ML
INJECTION, SUSPENSION INTRA-ARTICULAR; INTRALESIONAL; INTRAMUSCULAR; SOFT TISSUE AS NEEDED
Status: DISCONTINUED | OUTPATIENT
Start: 2020-09-04 | End: 2020-09-04

## 2020-09-04 RX ORDER — ONDANSETRON 2 MG/ML
4 INJECTION INTRAMUSCULAR; INTRAVENOUS ONCE AS NEEDED
Status: DISCONTINUED | OUTPATIENT
Start: 2020-09-04 | End: 2020-09-04

## 2020-09-04 RX ORDER — DIPHENHYDRAMINE HYDROCHLORIDE 50 MG/ML
50 INJECTION INTRAMUSCULAR; INTRAVENOUS ONCE AS NEEDED
Status: DISCONTINUED | OUTPATIENT
Start: 2020-09-04 | End: 2020-09-04

## 2020-09-04 RX ORDER — LIDOCAINE HYDROCHLORIDE 10 MG/ML
INJECTION, SOLUTION EPIDURAL; INFILTRATION; INTRACAUDAL; PERINEURAL AS NEEDED
Status: DISCONTINUED | OUTPATIENT
Start: 2020-09-04 | End: 2020-09-04

## 2020-09-04 NOTE — OPERATIVE REPORT
Operative Report     DATE OF PROCEDURE: 9/4/2020   PREOPERATIVE DIAGNOSES: 1. Lumbar facet arthropathy    2. Lumbar spondylosis        POSTOPERATIVE DIAGNOSES:   1. Lumbar facet arthropathy    2.  Lumbar spondylosis        PROCEDURES: Left L4-5, L5-S1 Z-jayla stable. Also, throughout the whole procedure, prior to injection of any medication, aspiration was performed. No blood, fluid, or air was aspirated at anytime.

## 2020-09-04 NOTE — H&P
[facility] Patient Status:  Hospital Outpatient Surgery    1975 MRN [id number]   [facility] ENDOSCOPY Attending Tigre Lema, [address] Day # 0 PCP Kel Lewis MD     Date of Admission: Oral Tab, Take 1 tablet (0.3 mg total) by mouth 2 (two) times daily. , Disp: 180 tablet, Rfl: 1  Losartan Potassium-HCTZ 100-25 MG Oral Tab, Take 1 tablet by mouth once daily, Disp: 90 tablet, Rfl: 1, Taking  Meloxicam 15 MG Oral Tab, Take 1 tablet (15 mg t

## 2020-10-09 ENCOUNTER — LAB ENCOUNTER (OUTPATIENT)
Dept: LAB | Age: 45
End: 2020-10-09
Attending: INTERNAL MEDICINE
Payer: COMMERCIAL

## 2020-10-09 ENCOUNTER — OFFICE VISIT (OUTPATIENT)
Dept: INTERNAL MEDICINE CLINIC | Facility: CLINIC | Age: 45
End: 2020-10-09
Payer: COMMERCIAL

## 2020-10-09 VITALS
WEIGHT: 189.19 LBS | BODY MASS INDEX: 29.35 KG/M2 | TEMPERATURE: 99 F | RESPIRATION RATE: 16 BRPM | DIASTOLIC BLOOD PRESSURE: 86 MMHG | HEART RATE: 80 BPM | SYSTOLIC BLOOD PRESSURE: 132 MMHG | HEIGHT: 67.5 IN

## 2020-10-09 DIAGNOSIS — I10 ESSENTIAL HYPERTENSION: ICD-10-CM

## 2020-10-09 DIAGNOSIS — Z23 FLU VACCINE NEED: ICD-10-CM

## 2020-10-09 DIAGNOSIS — R09.81 NASAL CONGESTION: ICD-10-CM

## 2020-10-09 DIAGNOSIS — I10 ESSENTIAL HYPERTENSION: Primary | ICD-10-CM

## 2020-10-09 DIAGNOSIS — E78.00 HYPERCHOLESTEROLEMIA: ICD-10-CM

## 2020-10-09 DIAGNOSIS — R79.89 LOW SERUM VITAMIN D: ICD-10-CM

## 2020-10-09 DIAGNOSIS — J45.20 MILD INTERMITTENT ASTHMA IN ADULT WITHOUT COMPLICATION: ICD-10-CM

## 2020-10-09 PROCEDURE — 80053 COMPREHEN METABOLIC PANEL: CPT | Performed by: INTERNAL MEDICINE

## 2020-10-09 PROCEDURE — 36415 COLL VENOUS BLD VENIPUNCTURE: CPT | Performed by: INTERNAL MEDICINE

## 2020-10-09 PROCEDURE — 82570 ASSAY OF URINE CREATININE: CPT | Performed by: INTERNAL MEDICINE

## 2020-10-09 PROCEDURE — 82043 UR ALBUMIN QUANTITATIVE: CPT | Performed by: INTERNAL MEDICINE

## 2020-10-09 PROCEDURE — 90471 IMMUNIZATION ADMIN: CPT | Performed by: INTERNAL MEDICINE

## 2020-10-09 PROCEDURE — 83036 HEMOGLOBIN GLYCOSYLATED A1C: CPT | Performed by: INTERNAL MEDICINE

## 2020-10-09 PROCEDURE — 3008F BODY MASS INDEX DOCD: CPT | Performed by: INTERNAL MEDICINE

## 2020-10-09 PROCEDURE — 3075F SYST BP GE 130 - 139MM HG: CPT | Performed by: INTERNAL MEDICINE

## 2020-10-09 PROCEDURE — 82306 VITAMIN D 25 HYDROXY: CPT | Performed by: INTERNAL MEDICINE

## 2020-10-09 PROCEDURE — 90686 IIV4 VACC NO PRSV 0.5 ML IM: CPT | Performed by: INTERNAL MEDICINE

## 2020-10-09 PROCEDURE — 80061 LIPID PANEL: CPT | Performed by: INTERNAL MEDICINE

## 2020-10-09 PROCEDURE — 99214 OFFICE O/P EST MOD 30 MIN: CPT | Performed by: INTERNAL MEDICINE

## 2020-10-09 PROCEDURE — 3079F DIAST BP 80-89 MM HG: CPT | Performed by: INTERNAL MEDICINE

## 2020-10-09 RX ORDER — FLUTICASONE PROPIONATE 50 MCG
2 SPRAY, SUSPENSION (ML) NASAL EVERY EVENING
Qty: 16 G | Refills: 1 | Status: SHIPPED | OUTPATIENT
Start: 2020-10-09 | End: 2020-11-20

## 2020-10-09 RX ORDER — ALBUTEROL SULFATE 90 UG/1
1-2 AEROSOL, METERED RESPIRATORY (INHALATION)
Qty: 1 INHALER | Refills: 0 | Status: SHIPPED | OUTPATIENT
Start: 2020-10-09 | End: 2020-11-20

## 2020-10-09 RX ORDER — AMLODIPINE BESYLATE 10 MG/1
10 TABLET ORAL DAILY
Qty: 90 TABLET | Refills: 1 | Status: SHIPPED | OUTPATIENT
Start: 2020-10-09 | End: 2020-12-22

## 2020-10-09 NOTE — PROGRESS NOTES
Patient presents with:  Hypertension      HPI: Rosaura Kennedy presents for 6-week f/u HTN. Home #s are still elevated. He's compliant w/ medication but tells me that he only takes the Clonidine once daily as he couldn't tolerate twice daily dosing.  All BP meds ar kg)  Gen - A&Ox3, NAD  HEENT - PERRL, EOMI, OP is clear; TMs clear B; nasal turbinates nl  Lungs - CTAB  CV - RRR, nl s1, s2  Abd - soft, NABS, NT, ND  Ext - no c/c/e  Psych - nl mood/affect       A/P:  Essential hypertension  (primary encounter diagnosis) mouth daily. • Fluticasone Propionate 50 MCG/ACT Nasal Suspension 16 g 1     Si sprays by Each Nare route every evening.        Imaging & Consults:  FLULAVAL INFLUENZA VACCINE QUAD PRESERVATIVE FREE 0.5 ML

## 2020-10-10 RX ORDER — CLONIDINE HYDROCHLORIDE 0.3 MG/1
0.3 TABLET ORAL EVERY EVENING
COMMUNITY

## 2020-11-03 ENCOUNTER — TELEPHONE (OUTPATIENT)
Dept: NEPHROLOGY | Facility: CLINIC | Age: 45
End: 2020-11-03

## 2020-11-03 NOTE — TELEPHONE ENCOUNTER
Patient was a no show for appointment with Sheela Leigh on 11/3/2020. Attempted to call patient unable to leave a message.

## 2020-11-11 NOTE — TELEPHONE ENCOUNTER
No protocol     Last refill:  7/5/1025 Folic Acid 1 mg #60 NR    LOV:   10/9/2020 Dr Doretha Vaz RTC 3 months  No FOV scheduled

## 2020-11-12 RX ORDER — FOLIC ACID 1 MG/1
TABLET ORAL
Qty: 90 TABLET | Refills: 0 | Status: SHIPPED | OUTPATIENT
Start: 2020-11-12 | End: 2021-02-11

## 2020-11-19 DIAGNOSIS — R09.81 NASAL CONGESTION: ICD-10-CM

## 2020-11-19 DIAGNOSIS — J45.20 MILD INTERMITTENT ASTHMA IN ADULT WITHOUT COMPLICATION: ICD-10-CM

## 2020-11-20 RX ORDER — FLUTICASONE PROPIONATE 50 MCG
SPRAY, SUSPENSION (ML) NASAL
Qty: 16 G | Refills: 0 | Status: SHIPPED | OUTPATIENT
Start: 2020-11-20 | End: 2021-02-11

## 2020-11-20 RX ORDER — ALBUTEROL SULFATE 90 UG/1
AEROSOL, METERED RESPIRATORY (INHALATION)
Qty: 9 G | Refills: 0 | Status: SHIPPED | OUTPATIENT
Start: 2020-11-20

## 2020-11-20 NOTE — TELEPHONE ENCOUNTER
Passed protocol     Last refill:  10/9/2020 Albuterol HFA 1 inh NR  10/9/2020 Fluticasone 50 mcg #16G 1R    LOV:   10/9/2020 Dr Mino Ward RTC 3 months  2. Asthma - Stable. Albuterol inhaler refill given. 3. Nasal congestion - Start trial of Flonase.  Script gi

## 2020-11-24 ENCOUNTER — OFFICE VISIT (OUTPATIENT)
Dept: NEPHROLOGY | Facility: CLINIC | Age: 45
End: 2020-11-24
Payer: COMMERCIAL

## 2020-11-24 VITALS — WEIGHT: 197 LBS | BODY MASS INDEX: 30 KG/M2 | SYSTOLIC BLOOD PRESSURE: 146 MMHG | DIASTOLIC BLOOD PRESSURE: 82 MMHG

## 2020-11-24 DIAGNOSIS — R80.9 PROTEINURIA, UNSPECIFIED TYPE: Primary | ICD-10-CM

## 2020-11-24 PROCEDURE — 99204 OFFICE O/P NEW MOD 45 MIN: CPT | Performed by: INTERNAL MEDICINE

## 2020-11-24 PROCEDURE — 99072 ADDL SUPL MATRL&STAF TM PHE: CPT | Performed by: INTERNAL MEDICINE

## 2020-11-24 PROCEDURE — 3077F SYST BP >= 140 MM HG: CPT | Performed by: INTERNAL MEDICINE

## 2020-11-24 PROCEDURE — 3079F DIAST BP 80-89 MM HG: CPT | Performed by: INTERNAL MEDICINE

## 2020-11-24 NOTE — PATIENT INSTRUCTIONS
Call  to schedule kidney Ultrasound in 1 month from today  Also, get urine studies to get reassessment of urine protein     Can get both things done around the same time    Low salt diet  Reduce smoking    Follow up with me after above complete

## 2020-11-24 NOTE — PROGRESS NOTES
Nephrology Consult Note      REASON FOR CONSULT:    proteinuria         HPI:   Joseph Bueno is a 54year old male with No chief complaint on file.     Tamiko Nicolas MD    53 y/o male with hx of HTN, stroke 2018 - visual deficits, anxiety who presents f SULFATE  (90 Base) MCG/ACT Inhalation Aero Soln INHALE 1 TO 2 PUFFS BY MOUTH EVERY 4 TO 6 HOURS AS NEEDED FOR WHEEZING FOR SHORTNESS OF BREATH 9 g 0   • FLUTICASONE PROPIONATE 50 MCG/ACT Nasal Suspension USE 2 SPRAY(S) IN EACH NOSTRIL IN THE EVENING No        Seat Belt: Yes        Special Diet: No        Stress Concern: Yes        Weight Concern: No         Family History:  Family History   Problem Relation Age of Onset   • Other (Other) Father         unknown   • Diabetes Mother    • Lipids Mother Ratio      1.0 - 2.0 1.1    Patient Fasting?        Yes Yes   Cholesterol, Total      <200 mg/dL  188   HDL Cholesterol      40 - 59 mg/dL  55   Triglycerides      30 - 149 mg/dL  182 (H)   LDL Cholesterol Calc      <100 mg/dL  97   VLDL      0 - 30 mg/dL additional w/u    2. Hx of CVA/blindness    3. HTN- as above; advised low salt diet; compliance with meds.  He is on ARB     F/U in 1 mo    Jeanne Dudley MD  11/24/2020  2:03 PM

## 2020-12-16 ENCOUNTER — LAB ENCOUNTER (OUTPATIENT)
Dept: LAB | Age: 45
End: 2020-12-16
Attending: INTERNAL MEDICINE
Payer: COMMERCIAL

## 2020-12-16 ENCOUNTER — HOSPITAL ENCOUNTER (OUTPATIENT)
Dept: ULTRASOUND IMAGING | Age: 45
Discharge: HOME | End: 2020-12-16
Attending: INTERNAL MEDICINE
Payer: COMMERCIAL

## 2020-12-16 DIAGNOSIS — R80.9 PROTEINURIA, UNSPECIFIED TYPE: ICD-10-CM

## 2020-12-16 PROCEDURE — 76770 US EXAM ABDO BACK WALL COMP: CPT | Performed by: INTERNAL MEDICINE

## 2020-12-16 PROCEDURE — 82570 ASSAY OF URINE CREATININE: CPT

## 2020-12-16 PROCEDURE — 81001 URINALYSIS AUTO W/SCOPE: CPT

## 2020-12-16 PROCEDURE — 84156 ASSAY OF PROTEIN URINE: CPT

## 2020-12-22 ENCOUNTER — OFFICE VISIT (OUTPATIENT)
Dept: NEPHROLOGY | Facility: CLINIC | Age: 45
End: 2020-12-22
Payer: COMMERCIAL

## 2020-12-22 VITALS — WEIGHT: 196 LBS | SYSTOLIC BLOOD PRESSURE: 138 MMHG | DIASTOLIC BLOOD PRESSURE: 76 MMHG | BODY MASS INDEX: 30 KG/M2

## 2020-12-22 DIAGNOSIS — R80.9 PROTEINURIA, UNSPECIFIED TYPE: Primary | ICD-10-CM

## 2020-12-22 DIAGNOSIS — I10 ESSENTIAL HYPERTENSION: ICD-10-CM

## 2020-12-22 PROCEDURE — 3078F DIAST BP <80 MM HG: CPT | Performed by: INTERNAL MEDICINE

## 2020-12-22 PROCEDURE — 99213 OFFICE O/P EST LOW 20 MIN: CPT | Performed by: INTERNAL MEDICINE

## 2020-12-22 PROCEDURE — 3075F SYST BP GE 130 - 139MM HG: CPT | Performed by: INTERNAL MEDICINE

## 2020-12-22 RX ORDER — AMLODIPINE BESYLATE 10 MG/1
5 TABLET ORAL DAILY
Qty: 90 TABLET | Refills: 1 | Status: SHIPPED | OUTPATIENT
Start: 2020-12-22 | End: 2021-08-20

## 2020-12-22 NOTE — PROGRESS NOTES
Nephrology Consult Note      REASON FOR CONSULT:    proteinuria         HPI:   Ahsan Hood is a 54year old male with No chief complaint on file.     Chato Byrnes MD    53 y/o male with hx of HTN, stroke 2018 - visual deficits, anxiety who presents f BREATH 9 g 0   • FLUTICASONE PROPIONATE 50 MCG/ACT Nasal Suspension USE 2 SPRAY(S) IN EACH NOSTRIL IN THE EVENING 16 g 0   • FOLIC ACID 1 MG Oral Tab Take 1 tablet by mouth once daily 90 tablet 0   • cloNIDine HCl 0.3 MG Oral Tab Take 0.3 mg by mouth every (Other) Father         unknown   • Diabetes Mother    • Lipids Mother    • Hypertension Mother             PHYSICAL EXAM:   There were no vitals taken for this visit.  146/82  Wt Readings from Last 6 Encounters:  11/24/20 : 197 lb (89.4 kg)  10/09/20 : 189 LDL Cholesterol Calc      <100 mg/dL  97   VLDL      0 - 30 mg/dL  36 (H)   NON HDL CHOL      <130 mg/dL  133 (H)   MALB URINE      mg/dL  73.20   CREATININE UR RANDOM      mg/dL  186.00   MALB/CRE CALC      <=30.0 ug/mg  393.5 (H)   HEMOGLOBIN A1c 23    ALT (SGPT)      16 - 61 U/L  36    ALKALINE PHOSPHATASE      45 - 117 U/L  45    Total Bilirubin      0.1 - 2.0 mg/dL  0.3    TOTAL PROTEIN      6.4 - 8.2 g/dL  7.3    Albumin      3.4 - 5.0 g/dL  3.9    Globulin      2.8 - 4.4 g/dL  3.4    A/G Rati diagnoses linked to this encounter. 1. Isolated proteinuria- suspect due to HTN; other etiologies not ruled out such as IgAN/FSGS.  His renal function is normal.  Renal US nl.  Repeat studies w/ sub-nephrotic range protein  - advised low salt diet/smoking

## 2021-02-10 DIAGNOSIS — R09.81 NASAL CONGESTION: ICD-10-CM

## 2021-02-11 RX ORDER — FLUTICASONE PROPIONATE 50 MCG
SPRAY, SUSPENSION (ML) NASAL
Qty: 16 G | Refills: 0 | Status: SHIPPED | OUTPATIENT
Start: 2021-02-11 | End: 2021-12-08

## 2021-02-11 RX ORDER — FOLIC ACID 1 MG/1
TABLET ORAL
Qty: 90 TABLET | Refills: 0 | Status: SHIPPED | OUTPATIENT
Start: 2021-02-11 | End: 2021-02-20

## 2021-02-11 NOTE — TELEPHONE ENCOUNTER
TRANSFER - IN REPORT:    [facility] on Sherrill Comber  being received from [address] [place] [address] for urgent transfer      Report consisted of patients Situation, Background, Assessment and   Recommendations(SBAR). Information from the following report(s) SBAR, Kardex, Intake/Output, MAR and Recent Results was reviewed with the receiving nurse. Opportunity for questions and clarification was provided. Assessment completed upon patients arrival to unit and care assumed. appt to establish care with RP scheduled for 2/22    Medication(s) to Refill:   Requested Prescriptions     Pending Prescriptions Disp Refills   • FLUTICASONE PROPIONATE 50 MCG/ACT Nasal Suspension [Pharmacy Med Name: Fluticasone Propionate 50 MCG/ACT Nasa

## 2021-02-20 RX ORDER — FOLIC ACID 1 MG/1
TABLET ORAL
Qty: 90 TABLET | Refills: 0 | Status: SHIPPED | OUTPATIENT
Start: 2021-02-20 | End: 2021-09-22

## 2021-02-20 NOTE — TELEPHONE ENCOUNTER
LOV: 10/9/2020 with Dr. Joanei Chapin  RTC: 3 months  Last Relevant Labs: 12/16/2020   Filled: 2/11/2021   #90 with 0 refills    Future Appointments   Date Time Provider [facility]   2/22/2021  4:30 PM Stefany Rucker MD [facility] [place]   4/20/2021  2:0

## 2021-02-22 ENCOUNTER — OFFICE VISIT (OUTPATIENT)
Dept: INTERNAL MEDICINE CLINIC | Facility: CLINIC | Age: 46
End: 2021-02-22
Payer: COMMERCIAL

## 2021-02-22 VITALS
BODY MASS INDEX: 29.63 KG/M2 | SYSTOLIC BLOOD PRESSURE: 134 MMHG | OXYGEN SATURATION: 99 % | RESPIRATION RATE: 16 BRPM | HEIGHT: 67.5 IN | WEIGHT: 191 LBS | TEMPERATURE: 98 F | HEART RATE: 81 BPM | DIASTOLIC BLOOD PRESSURE: 76 MMHG

## 2021-02-22 DIAGNOSIS — H47.293 SECONDARY OPTIC ATROPHY OF BOTH EYES: Chronic | ICD-10-CM

## 2021-02-22 DIAGNOSIS — M54.9 CHRONIC NECK AND BACK PAIN: ICD-10-CM

## 2021-02-22 DIAGNOSIS — R80.9 PROTEINURIA, UNSPECIFIED TYPE: ICD-10-CM

## 2021-02-22 DIAGNOSIS — R09.81 NASAL CONGESTION: Primary | ICD-10-CM

## 2021-02-22 DIAGNOSIS — M79.9 SOFT TISSUE LESION OF KNEE REGION: ICD-10-CM

## 2021-02-22 DIAGNOSIS — I10 ESSENTIAL HYPERTENSION: Chronic | ICD-10-CM

## 2021-02-22 DIAGNOSIS — E78.00 HYPERCHOLESTEROLEMIA: Chronic | ICD-10-CM

## 2021-02-22 DIAGNOSIS — M54.2 CHRONIC NECK AND BACK PAIN: ICD-10-CM

## 2021-02-22 DIAGNOSIS — J45.20 MILD INTERMITTENT ASTHMA IN ADULT WITHOUT COMPLICATION: Chronic | ICD-10-CM

## 2021-02-22 DIAGNOSIS — G89.29 CHRONIC NECK AND BACK PAIN: ICD-10-CM

## 2021-02-22 PROCEDURE — 3075F SYST BP GE 130 - 139MM HG: CPT | Performed by: INTERNAL MEDICINE

## 2021-02-22 PROCEDURE — 3078F DIAST BP <80 MM HG: CPT | Performed by: INTERNAL MEDICINE

## 2021-02-22 PROCEDURE — 99214 OFFICE O/P EST MOD 30 MIN: CPT | Performed by: INTERNAL MEDICINE

## 2021-02-22 PROCEDURE — 3008F BODY MASS INDEX DOCD: CPT | Performed by: INTERNAL MEDICINE

## 2021-02-22 RX ORDER — AZELASTINE 1 MG/ML
1 SPRAY, METERED NASAL 2 TIMES DAILY
Qty: 30 ML | Refills: 1 | Status: SHIPPED | OUTPATIENT
Start: 2021-02-22 | End: 2021-08-20 | Stop reason: ALTCHOICE

## 2021-02-22 NOTE — PATIENT INSTRUCTIONS
- Blood pressure looks good. Continue current medications  - Schedule ultrasound of the right knee. Call [phone] to schedule  - Use azelastine nasal spray for nasal congestion. Use 1 spray twice daily for 2-3 weeks.   Call us or send a Mychart if yo

## 2021-02-22 NOTE — PROGRESS NOTES
Joseph Bueno is a 54year old male. HPI:   Patient presents with:  Establish Care    Patient presents for follow up on several medical issues. He has been dealing with worsening nasal and sinus congestion over the past 1-2 months.  Waking up in the mi Disp: 16 g, Rfl: 0  •  amLODIPine Besylate 10 MG Oral Tab, Take 0.5 tablets (5 mg total) by mouth daily. , Disp: 90 tablet, Rfl: 1  •  ALBUTEROL SULFATE  (90 Base) MCG/ACT Inhalation Aero Soln, INHALE 1 TO 2 PUFFS BY MOUTH EVERY 4 TO 6 HOURS AS NEEDE °C) (Oral)   Resp 16   Ht 5' 7.5\" (1.715 m)   Wt 191 lb (86.6 kg)   SpO2 99%   BMI 29.47 kg/m²   GENERAL: Alert and oriented, well developed, well nourished,in no apparent distress  SKIN: soft tissue lesion right anterolateral knee  HEENT: atraumatic, PER Physiatry (Dr. Toney Schmidt). 8. Proteinuria, unspecified type  Patient following with Nephrology (Dr. Catarina Escobar). His amlodipine dose was decreased due to proteinuria, currently back at 5 mg every day dosing.   He has follow up with Nephrology in April; to do

## 2021-02-23 PROBLEM — H47.293 SECONDARY OPTIC ATROPHY OF BOTH EYES: Chronic | Status: ACTIVE | Noted: 2019-06-05

## 2021-02-23 PROBLEM — R80.9 PROTEINURIA: Status: ACTIVE | Noted: 2021-02-23

## 2021-03-19 RX ORDER — LOSARTAN POTASSIUM AND HYDROCHLOROTHIAZIDE 25; 100 MG/1; MG/1
TABLET ORAL
Qty: 90 TABLET | Refills: 0 | Status: SHIPPED | OUTPATIENT
Start: 2021-03-19 | End: 2021-07-12

## 2021-03-19 NOTE — TELEPHONE ENCOUNTER
Passed protocol     Last refill:  8/20/2020 Losartan hydrochlorothiazide 100-25 mg #90 1R    LOV:   2/22/2021 Dr Mela Bucio RTC 6 months  3. Essential hypertension  At goal blood pressure. Normal creatinine and electrolytes in October.   Continue losartan-HCT

## 2021-03-24 ENCOUNTER — TELEPHONE (OUTPATIENT)
Dept: SURGERY | Facility: CLINIC | Age: 46
End: 2021-03-24

## 2021-03-24 ENCOUNTER — TELEPHONE (OUTPATIENT)
Dept: NEUROLOGY | Facility: CLINIC | Age: 46
End: 2021-03-24

## 2021-03-24 DIAGNOSIS — M54.42 CHRONIC LEFT-SIDED LOW BACK PAIN WITH LEFT-SIDED SCIATICA: Primary | ICD-10-CM

## 2021-03-24 DIAGNOSIS — G89.29 CHRONIC LEFT-SIDED LOW BACK PAIN WITH LEFT-SIDED SCIATICA: Primary | ICD-10-CM

## 2021-03-24 NOTE — TELEPHONE ENCOUNTER
Patient reports pain has worsened in the last 3 weeks and would like to know what he can take for pain relief. Patient states he is not able to go back to work since he is in a lot of pain.      Pain is in lower back, patients states pain is right in the mi

## 2021-03-24 NOTE — TELEPHONE ENCOUNTER
Pt calling stating back pain is \"excrutiating\", has appt for 04/08 (first available), would like to know what to do in meantime

## 2021-03-25 RX ORDER — MELOXICAM 15 MG/1
TABLET ORAL
Qty: 30 TABLET | Refills: 0 | Status: SHIPPED | OUTPATIENT
Start: 2021-03-25 | End: 2021-04-09

## 2021-03-25 NOTE — TELEPHONE ENCOUNTER
Notified patient that medication was sent to [facility]

## 2021-04-09 ENCOUNTER — OFFICE VISIT (OUTPATIENT)
Dept: PHYSICAL MEDICINE AND REHAB | Facility: CLINIC | Age: 46
End: 2021-04-09
Payer: COMMERCIAL

## 2021-04-09 VITALS — HEART RATE: 98 BPM | DIASTOLIC BLOOD PRESSURE: 104 MMHG | SYSTOLIC BLOOD PRESSURE: 176 MMHG

## 2021-04-09 DIAGNOSIS — G89.29 CHRONIC LEFT-SIDED LOW BACK PAIN WITH LEFT-SIDED SCIATICA: ICD-10-CM

## 2021-04-09 DIAGNOSIS — M47.816 LUMBAR FACET ARTHROPATHY: Primary | ICD-10-CM

## 2021-04-09 DIAGNOSIS — M54.42 CHRONIC LEFT-SIDED LOW BACK PAIN WITH LEFT-SIDED SCIATICA: ICD-10-CM

## 2021-04-09 PROCEDURE — 99214 OFFICE O/P EST MOD 30 MIN: CPT | Performed by: PHYSICAL MEDICINE & REHABILITATION

## 2021-04-09 PROCEDURE — 3080F DIAST BP >= 90 MM HG: CPT | Performed by: PHYSICAL MEDICINE & REHABILITATION

## 2021-04-09 PROCEDURE — 3077F SYST BP >= 140 MM HG: CPT | Performed by: PHYSICAL MEDICINE & REHABILITATION

## 2021-04-09 RX ORDER — TRAMADOL HYDROCHLORIDE 50 MG/1
50 TABLET ORAL 2 TIMES DAILY PRN
Qty: 20 TABLET | Refills: 0 | Status: SHIPPED | OUTPATIENT
Start: 2021-04-09 | End: 2021-08-20 | Stop reason: ALTCHOICE

## 2021-04-09 RX ORDER — MELOXICAM 15 MG/1
15 TABLET ORAL DAILY PRN
Qty: 30 TABLET | Refills: 0 | Status: SHIPPED | OUTPATIENT
Start: 2021-04-09 | End: 2021-08-20

## 2021-04-09 NOTE — PROGRESS NOTES
Patient presents in office today with reported pain in lumbar region, right side down to groin    Current pain level reported = 10/10    Last reported dose of meloxicam yesterday No Residual Tumor Seen Histology Text: No residual malignant cells noted

## 2021-04-10 NOTE — PROGRESS NOTES
Progress note    C/C: right low back pain    HPI: 53 yo m presents for follow up.  Underwent left L4-5, L5-S1 FJI under fluoroscopy, local on 9/4/2020 with 100% relief for 2 months, and then pain slowly and insidiously returned, though it is now midline and

## 2021-04-12 ENCOUNTER — HOSPITAL ENCOUNTER (OUTPATIENT)
Dept: GENERAL RADIOLOGY | Age: 46
Discharge: HOME | End: 2021-04-12
Attending: PHYSICAL MEDICINE & REHABILITATION
Payer: COMMERCIAL

## 2021-04-12 DIAGNOSIS — M47.816 LUMBAR FACET ARTHROPATHY: ICD-10-CM

## 2021-04-12 PROCEDURE — 72110 X-RAY EXAM L-2 SPINE 4/>VWS: CPT | Performed by: PHYSICAL MEDICINE & REHABILITATION

## 2021-04-13 ENCOUNTER — TELEPHONE (OUTPATIENT)
Dept: PAIN CLINIC | Facility: CLINIC | Age: 46
End: 2021-04-13

## 2021-04-13 DIAGNOSIS — M47.816 LUMBAR FACET ARTHROPATHY: Primary | ICD-10-CM

## 2021-04-13 NOTE — TELEPHONE ENCOUNTER
Prior authorization request completed for: Bilateral L4-5,L5-S1 MBB  Authorization #[id number]    Authorization dates: 04/13/21 - 04/26/21  CPT codes approved: 63740,76042  Number of visits/dates of service approved: 1  Physician: Jeffery Nelson   Location:

## 2021-04-13 NOTE — TELEPHONE ENCOUNTER
Patient advised of insurance approval and medical clearance to proceed with injections and is agreeable to scheduling. Patient scheduled for procedure, pre-procedure instructions reviewed.  Pt was [facility]

## 2021-04-13 NOTE — TELEPHONE ENCOUNTER
Johnson Hi MD  You 11 minutes ago (12:55 PM)     Susanne Love from my perspective - entered a letter in his chart.   - Dr. Marnell Goldberg text      May also see 4/13 letter from Dr. Marcelo Fong for clearance.

## 2021-04-13 NOTE — TELEPHONE ENCOUNTER
COVID test ordered    Completed CFS scheduling form and faxed to Jimi Andrade at [phone]. Confirmation received.

## 2021-04-13 NOTE — TELEPHONE ENCOUNTER
XR reviewed, unchanged from previous. No compression fracture. Bilateral L4-5, L5-S1 facet joint injections under fluoroscopy, local ordered.  Please offer earliest date at [address], [address] for Surgery, or we can probably do it earlier at [facility].

## 2021-04-13 NOTE — TELEPHONE ENCOUNTER
21  RE: Ahsan Hood    : 1975    Dear Dr. Preeti Murdock    Your patient is being scheduled for a pain management procedure at [facility] within the next 4 weeks.     Procedure:  BILATERAL LUMBAR 4 - LUMBAR 5, LUMBAR 5- SACRAL 1 FACET JOINT IN

## 2021-04-13 NOTE — TELEPHONE ENCOUNTER
Patient has [employer] [facility] as primary Insurance. Prior Authorization required prior to scheduling. Prior Auth is location specific and cannot be completed for both locations.      Dr Deysi Rich to advise on location- [place] or [facility] so PA can be in

## 2021-04-13 NOTE — TELEPHONE ENCOUNTER
Question Answer Comment   Anesthesia Type Local    Provider [address] [facility] for Surgery   Procedure Facet BILATERAL LUMBAR 4 - LUMBAR 5, LUMBAR 5- SACRAL 1 FACET JOINT INJECTIONS UNDER FLUOROSCOPY, LOCAL   Laterality/Level BILATERAL    Medic

## 2021-04-13 NOTE — TELEPHONE ENCOUNTER
Patient recommended for procedure with Dr. Patty Alegria. Patient is currently taking ASA 325mg managed by Dr. Meli Her. Letter of medical clearance sent to Dr. Meli Her requesting approval for patient to hold ASA 325mg for 7 days prior to recommended procedure.

## 2021-04-16 ENCOUNTER — LAB ENCOUNTER (OUTPATIENT)
Dept: LAB | Age: 46
End: 2021-04-16
Attending: INTERNAL MEDICINE
Payer: COMMERCIAL

## 2021-04-16 DIAGNOSIS — R80.9 PROTEINURIA, UNSPECIFIED TYPE: ICD-10-CM

## 2021-04-16 DIAGNOSIS — M47.816 LUMBAR FACET ARTHROPATHY: ICD-10-CM

## 2021-04-16 DIAGNOSIS — Z00.00 ROUTINE GENERAL MEDICAL EXAMINATION AT A HEALTH CARE FACILITY: Primary | ICD-10-CM

## 2021-04-16 PROCEDURE — 82570 ASSAY OF URINE CREATININE: CPT | Performed by: INTERNAL MEDICINE

## 2021-04-16 PROCEDURE — 81001 URINALYSIS AUTO W/SCOPE: CPT | Performed by: INTERNAL MEDICINE

## 2021-04-16 PROCEDURE — 83735 ASSAY OF MAGNESIUM: CPT | Performed by: INTERNAL MEDICINE

## 2021-04-16 PROCEDURE — 85025 COMPLETE CBC W/AUTO DIFF WBC: CPT | Performed by: INTERNAL MEDICINE

## 2021-04-16 PROCEDURE — 84156 ASSAY OF PROTEIN URINE: CPT | Performed by: INTERNAL MEDICINE

## 2021-04-16 PROCEDURE — 36415 COLL VENOUS BLD VENIPUNCTURE: CPT | Performed by: INTERNAL MEDICINE

## 2021-04-16 PROCEDURE — 80048 BASIC METABOLIC PNL TOTAL CA: CPT | Performed by: INTERNAL MEDICINE

## 2021-04-20 ENCOUNTER — LAB ENCOUNTER (OUTPATIENT)
Dept: LAB | Age: 46
End: 2021-04-20
Attending: PHYSICAL MEDICINE & REHABILITATION
Payer: COMMERCIAL

## 2021-04-20 ENCOUNTER — OFFICE VISIT (OUTPATIENT)
Dept: NEPHROLOGY | Facility: CLINIC | Age: 46
End: 2021-04-20
Payer: COMMERCIAL

## 2021-04-20 VITALS — DIASTOLIC BLOOD PRESSURE: 82 MMHG | SYSTOLIC BLOOD PRESSURE: 126 MMHG | BODY MASS INDEX: 29 KG/M2 | WEIGHT: 185.19 LBS

## 2021-04-20 DIAGNOSIS — Z00.00 ROUTINE GENERAL MEDICAL EXAMINATION AT A HEALTH CARE FACILITY: ICD-10-CM

## 2021-04-20 DIAGNOSIS — R80.9 PROTEINURIA, UNSPECIFIED TYPE: Primary | ICD-10-CM

## 2021-04-20 DIAGNOSIS — M47.816 LUMBAR FACET ARTHROPATHY: ICD-10-CM

## 2021-04-20 PROCEDURE — 99213 OFFICE O/P EST LOW 20 MIN: CPT | Performed by: INTERNAL MEDICINE

## 2021-04-20 PROCEDURE — 3079F DIAST BP 80-89 MM HG: CPT | Performed by: INTERNAL MEDICINE

## 2021-04-20 PROCEDURE — 3074F SYST BP LT 130 MM HG: CPT | Performed by: INTERNAL MEDICINE

## 2021-04-20 NOTE — PROGRESS NOTES
Nephrology Consult Note      REASON FOR CONSULT:    proteinuria         HPI:   Izella Boas is a 54year old male with Patient presents with:  Hypertension  Proteinuria    Elder MD Harvey    55 y/o male with hx of HTN, stroke 2018 - visual deficits, mL 1   • FOLIC ACID 1 MG Oral Tab Take 1 tablet by mouth once daily 90 tablet 0   • FLUTICASONE PROPIONATE 50 MCG/ACT Nasal Suspension USE 2 SPRAY(S) IN EACH NOSTRIL IN THE EVENING 16 g 0   • amLODIPine Besylate 10 MG Oral Tab Take 0.5 tablets (5 mg total) History:  Family History   Problem Relation Age of Onset   • Other (Other) Father         unknown   • Diabetes Mother    • Lipids Mother    • Hypertension Mother             PHYSICAL EXAM:   /82   Wt 185 lb 3.2 oz (84 kg)   BMI 28.58 kg/m²  146/82  W 17.5 g/dL 15.2    Hematocrit Latest Ref Range: 39.0 - 53.0 % 45.3    Platelet Count Latest Ref Range: 150.0 - 450.0 10(3)uL 207.0    RBC Latest Ref Range: 4.30 - 5.70 x10(6)uL 4.87    MCH Latest Ref Range: 26.0 - 34.0 pg 31.2    MCHC Latest Ref Range: 31. 0 SQUAM EPI CELLS UR Latest Ref Range: None Seen /HPF None Seen    RENAL TUBULAR EPITHELIAL CELLS Latest Ref Range: None Seen /HPF None Seen    TRANSITIONAL EPI CELLS Latest Ref Range: None Seen /HPF None Seen    YEAST URINE Latest Ref Range: None Seen /HP  Latest Ref Range: >=60  125   ANION GAP Latest Ref Range: 0 - 18 mmol/L 7   CALCULATED OSMOLALITY Latest Ref Range: 275 - 295 mOsm/kg 295   Magnesium, Serum Latest Ref Range: 1.6 - 2.6 mg/dL 2.2   Patient Fasting?  Unknown Yes   WBC Latest mg/dL <2.0   Nitrite Urine Latest Ref Range: Negative  Negative   Leukocyte Esterase Urine Latest Ref Range: Negative  Negative   WBC Urine Latest Ref Range: 0 - 5 /HPF None Seen   RBC Urine Latest Ref Range: 0 - 2 /HPF 3-5 (A)   Bacteria Urine Latest Ref

## 2021-04-23 ENCOUNTER — OFFICE VISIT (OUTPATIENT)
Dept: SURGERY | Facility: CLINIC | Age: 46
End: 2021-04-23

## 2021-04-23 DIAGNOSIS — M47.816 LUMBAR FACET ARTHROPATHY: ICD-10-CM

## 2021-04-23 PROCEDURE — 64494 INJ PARAVERT F JNT L/S 2 LEV: CPT | Performed by: PHYSICAL MEDICINE & REHABILITATION

## 2021-04-23 PROCEDURE — 64493 INJ PARAVERT F JNT L/S 1 LEV: CPT | Performed by: PHYSICAL MEDICINE & REHABILITATION

## 2021-04-23 NOTE — PROCEDURES
Operative Report     DATE OF PROCEDURE: 4/23/21   PREOPERATIVE DIAGNOSES: 1. Lumbar facet arthropathy    2. Lumbar spondylosis        POSTOPERATIVE DIAGNOSES:   1. Lumbar facet arthropathy    2.  Lumbar spondylosis        PROCEDURES: Bilateral L4-5, L5-S1 Z monitored and they remained completely stable. Also, throughout the whole procedure, prior to injection of any medication, aspiration was performed. No blood, fluid, or air was aspirated at anytime.

## 2021-04-27 ENCOUNTER — TELEPHONE (OUTPATIENT)
Dept: PAIN CLINIC | Facility: CLINIC | Age: 46
End: 2021-04-27

## 2021-04-27 NOTE — TELEPHONE ENCOUNTER
surgical note for Lumbar facet arthropathy and lumbar spondylosis  4/23/21    Forwarded to pain group

## 2021-07-12 DIAGNOSIS — I10 ESSENTIAL HYPERTENSION: Primary | ICD-10-CM

## 2021-07-12 RX ORDER — LOSARTAN POTASSIUM AND HYDROCHLOROTHIAZIDE 25; 100 MG/1; MG/1
TABLET ORAL
Qty: 90 TABLET | Refills: 0 | Status: SHIPPED | OUTPATIENT
Start: 2021-07-12 | End: 2021-11-03

## 2021-07-12 NOTE — TELEPHONE ENCOUNTER
Losartan-hydrochlorothiazide 100-25 mg  Filled 3-19-21  Qty 90  0 refills  Upcoming appt.: 8-23-21  LOV 2-22-21

## 2021-08-11 ENCOUNTER — TELEPHONE (OUTPATIENT)
Dept: SURGERY | Facility: CLINIC | Age: 46
End: 2021-08-11

## 2021-08-11 DIAGNOSIS — M47.816 LUMBAR FACET ARTHROPATHY: Primary | ICD-10-CM

## 2021-08-11 NOTE — TELEPHONE ENCOUNTER
LOV 4/9/2021. Bilat lumbar facets completed on 4/23/2021. Last Rx for meloxicam provided by Dr. Stacy Donnelly on 4/9/2021. Chart does not reflect any recent UC or ED visits.

## 2021-08-11 NOTE — TELEPHONE ENCOUNTER
Pt \"tweaked\" his back last week and is in a lot of pain. He said he fell this past Sunday and \"rolled his hip\". He is unable to walk for long periods of time, he thinks his sciatica is acting up and he has pain in his lumbar region.  Pt would like to kn

## 2021-08-12 RX ORDER — MELOXICAM 15 MG/1
TABLET ORAL
Qty: 30 TABLET | Refills: 0 | Status: SHIPPED | OUTPATIENT
Start: 2021-08-12 | End: 2021-09-23

## 2021-08-12 NOTE — TELEPHONE ENCOUNTER
Follow up appt with Dr Winston Hahn scheduled 8/20/2021     Letter drafted and pending provider review and approval.

## 2021-08-12 NOTE — TELEPHONE ENCOUNTER
Viviana Dejesus, DO  You 24 minutes ago (2:11 PM)        Note reviewed and approved. I should see him on 8/20 since I am approving time off work.          Documentation

## 2021-08-12 NOTE — TELEPHONE ENCOUNTER
Pt calling again, he is in a lot of pain. He is having difficulty walking. Please call pt to discuss.

## 2021-08-12 NOTE — TELEPHONE ENCOUNTER
Meloxicam prescribed; if there are any red flag signs (f/c/weakness bilateral lower extremities/b/b incontinence) needs to go to the ER.

## 2021-08-20 ENCOUNTER — OFFICE VISIT (OUTPATIENT)
Dept: PHYSICAL MEDICINE AND REHAB | Facility: CLINIC | Age: 46
End: 2021-08-20
Payer: COMMERCIAL

## 2021-08-20 VITALS
SYSTOLIC BLOOD PRESSURE: 138 MMHG | DIASTOLIC BLOOD PRESSURE: 80 MMHG | OXYGEN SATURATION: 98 % | HEART RATE: 75 BPM | WEIGHT: 185 LBS | BODY MASS INDEX: 29 KG/M2

## 2021-08-20 DIAGNOSIS — M25.552 LEFT HIP PAIN: ICD-10-CM

## 2021-08-20 DIAGNOSIS — M54.42 CHRONIC LEFT-SIDED LOW BACK PAIN WITH LEFT-SIDED SCIATICA: Primary | ICD-10-CM

## 2021-08-20 DIAGNOSIS — G89.29 CHRONIC LEFT-SIDED LOW BACK PAIN WITH LEFT-SIDED SCIATICA: Primary | ICD-10-CM

## 2021-08-20 PROCEDURE — 99214 OFFICE O/P EST MOD 30 MIN: CPT | Performed by: PHYSICAL MEDICINE & REHABILITATION

## 2021-08-20 PROCEDURE — 3075F SYST BP GE 130 - 139MM HG: CPT | Performed by: PHYSICAL MEDICINE & REHABILITATION

## 2021-08-20 PROCEDURE — 3079F DIAST BP 80-89 MM HG: CPT | Performed by: PHYSICAL MEDICINE & REHABILITATION

## 2021-08-20 RX ORDER — HYDROCODONE BITARTRATE AND ACETAMINOPHEN 5; 325 MG/1; MG/1
1 TABLET ORAL 2 TIMES DAILY PRN
Qty: 14 TABLET | Refills: 0 | Status: SHIPPED | OUTPATIENT
Start: 2021-08-20 | End: 2021-08-27

## 2021-08-20 RX ORDER — ATORVASTATIN CALCIUM 20 MG/1
20 TABLET, FILM COATED ORAL NIGHTLY
COMMUNITY
End: 2021-11-23

## 2021-08-20 NOTE — PROGRESS NOTES
Progress note    C/C: left hip/low back pain    HPI: 71-year-old legally blind male presents for follow-up. 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 He began to have lower back soreness.   1 to 2 days late

## 2021-08-21 ENCOUNTER — HOSPITAL ENCOUNTER (OUTPATIENT)
Dept: GENERAL RADIOLOGY | Age: 46
Discharge: HOME | End: 2021-08-21
Attending: PHYSICAL MEDICINE & REHABILITATION
Payer: COMMERCIAL

## 2021-08-21 DIAGNOSIS — M54.42 CHRONIC LEFT-SIDED LOW BACK PAIN WITH LEFT-SIDED SCIATICA: ICD-10-CM

## 2021-08-21 DIAGNOSIS — G89.29 CHRONIC LEFT-SIDED LOW BACK PAIN WITH LEFT-SIDED SCIATICA: ICD-10-CM

## 2021-08-21 DIAGNOSIS — M25.552 LEFT HIP PAIN: ICD-10-CM

## 2021-08-21 PROCEDURE — 73502 X-RAY EXAM HIP UNI 2-3 VIEWS: CPT | Performed by: PHYSICAL MEDICINE & REHABILITATION

## 2021-08-21 PROCEDURE — 72110 X-RAY EXAM L-2 SPINE 4/>VWS: CPT | Performed by: PHYSICAL MEDICINE & REHABILITATION

## 2021-08-23 ENCOUNTER — TELEPHONE (OUTPATIENT)
Dept: PAIN CLINIC | Facility: CLINIC | Age: 46
End: 2021-08-23

## 2021-08-23 DIAGNOSIS — R10.32 LEFT GROIN PAIN: Primary | ICD-10-CM

## 2021-08-23 NOTE — TELEPHONE ENCOUNTER
Patient completed Xrays 8/21/2021    Forwarded to Dr Darwin Link to review and provide further recommendations.

## 2021-08-23 NOTE — TELEPHONE ENCOUNTER
Just sent a replay on this, recommend MRI of the left hip for further evaluation. I placed the order.

## 2021-08-23 NOTE — TELEPHONE ENCOUNTER
Malinda April, DO  P [facility]  Please let the patient know the Xrays of of the hip and lower back do not show any fractures. The hip joint space looks good.  I still have a suspicion for something going on in the left hip and would like him to do an

## 2021-08-24 NOTE — TELEPHONE ENCOUNTER
Patient notified of Xray results and recommendations per Dr Aaron Adair below. Advised patient to wait to schedule MRI until he receives a call from the referral team with authorization.  Advised patient referral team will work with his insurance to obtain [facility]

## 2021-08-26 ENCOUNTER — HOSPITAL ENCOUNTER (OUTPATIENT)
Dept: MRI IMAGING | Age: 46
Discharge: HOME | End: 2021-08-26
Attending: PHYSICAL MEDICINE & REHABILITATION
Payer: COMMERCIAL

## 2021-08-26 DIAGNOSIS — R10.32 LEFT GROIN PAIN: ICD-10-CM

## 2021-08-26 PROCEDURE — 73721 MRI JNT OF LWR EXTRE W/O DYE: CPT | Performed by: PHYSICAL MEDICINE & REHABILITATION

## 2021-08-27 ENCOUNTER — TELEPHONE (OUTPATIENT)
Dept: SURGERY | Facility: CLINIC | Age: 46
End: 2021-08-27

## 2021-08-27 DIAGNOSIS — G89.29 CHRONIC LEFT-SIDED LOW BACK PAIN WITH LEFT-SIDED SCIATICA: ICD-10-CM

## 2021-08-27 DIAGNOSIS — M25.552 LEFT HIP PAIN: Primary | ICD-10-CM

## 2021-08-27 DIAGNOSIS — M54.42 CHRONIC LEFT-SIDED LOW BACK PAIN WITH LEFT-SIDED SCIATICA: ICD-10-CM

## 2021-08-27 DIAGNOSIS — M25.552 LEFT HIP PAIN: ICD-10-CM

## 2021-08-27 RX ORDER — HYDROCODONE BITARTRATE AND ACETAMINOPHEN 5; 325 MG/1; MG/1
1 TABLET ORAL 2 TIMES DAILY PRN
Qty: 14 TABLET | Refills: 0 | Status: SHIPPED | OUTPATIENT
Start: 2021-08-27 | End: 2021-10-18

## 2021-08-27 RX ORDER — DIAZEPAM 5 MG/1
TABLET ORAL
Qty: 2 TABLET | Refills: 0 | Status: SHIPPED | OUTPATIENT
Start: 2021-08-27 | End: 2021-10-18

## 2021-08-27 NOTE — TELEPHONE ENCOUNTER
Discussed with PA team. New MRI will need to be ordered and authorized through insurance since partial results were obtained.

## 2021-08-27 NOTE — TELEPHONE ENCOUNTER
Medication: HYDROcodone-acetaminophen 5-325 MG Oral Tab    Date of last refill: 08/20/21  [place] 72/47/70    Last office visit: 08/20/21  Due back to clinic per last office note:  na  Date next office visit scheduled:

## 2021-08-27 NOTE — TELEPHONE ENCOUNTER
Pt states he was not able to complete MRI due to claustrophobia. He wants to know if Dr Toney Schmidt can see what he needs to with what images they were able to get, if not pt states he would need something prescribed for anxiety.   Pls call pt to advise

## 2021-08-27 NOTE — TELEPHONE ENCOUNTER
Noted patient completed MRI Hips yesterday 8/26/2021. Dr Mike Mckeon to review and advise on MRI results and recommendations.

## 2021-08-27 NOTE — TELEPHONE ENCOUNTER
Patient called back. Informed new MRI order has been placed and office will obtain authorization from his Insurance and contact him once approved. Advised patient a sedative- Valium has been prescribed to be taken prior to MRI. Rx sent to [facility]

## 2021-08-27 NOTE — TELEPHONE ENCOUNTER
Patient calling to request refill of HYDROcodone-acetaminophen 5-325 MG Oral Tab  Pharmacy [facility], [address] [place] [address] Road [phone], [phone]    Patient informed of 48 hour refill policy excluding weekends and holidays.   Inf

## 2021-08-31 ENCOUNTER — HOSPITAL ENCOUNTER (OUTPATIENT)
Dept: MRI IMAGING | Facility: HOSPITAL | Age: 46
Discharge: HOME | End: 2021-08-31
Attending: PHYSICAL MEDICINE & REHABILITATION
Payer: COMMERCIAL

## 2021-08-31 DIAGNOSIS — M25.552 LEFT HIP PAIN: ICD-10-CM

## 2021-08-31 PROCEDURE — 73721 MRI JNT OF LWR EXTRE W/O DYE: CPT | Performed by: PHYSICAL MEDICINE & REHABILITATION

## 2021-09-01 ENCOUNTER — TELEPHONE (OUTPATIENT)
Dept: ORTHOPEDICS CLINIC | Facility: CLINIC | Age: 46
End: 2021-09-01

## 2021-09-01 NOTE — TELEPHONE ENCOUNTER
Patient stated he has enough until Friday 9/3    Dr Alonso Guadarrama to prescribe Norco 7.5-325 mg     [address]

## 2021-09-01 NOTE — TELEPHONE ENCOUNTER
Spoke with patient and relayed MRI results and recommendations below.      Patient states he has enough Norco 5-325 mg untill Friday and has been taking 3 per day for the pain     Asking if Dr Sherita Menendez can increase his dosage to Norco  mg until he is s

## 2021-09-01 NOTE — TELEPHONE ENCOUNTER
Patient is scheduled on 10/6/2021 with Dr. Sonam Altman for left hip pain. Please advise if x-rays are needed.

## 2021-09-01 NOTE — TELEPHONE ENCOUNTER
Dr. Indu Hampton. Can increase to 7.5/325, definitiely do not recommend more than three times daily, ideally twice daily.  Does he still have some norco?

## 2021-09-01 NOTE — TELEPHONE ENCOUNTER
Joshua Mcginnis DO  [facility]  Please let the patient know he has early findings of decreased blood flow to the hip bone, something called avascular necrosis. Recommend he see orthopedics for evaluation - Dr. Lynn Richmond.  May not need surgery but

## 2021-09-02 RX ORDER — HYDROCODONE BITARTRATE AND ACETAMINOPHEN 7.5; 325 MG/1; MG/1
1 TABLET ORAL 3 TIMES DAILY PRN
Qty: 30 TABLET | Refills: 0 | Status: SHIPPED | OUTPATIENT
Start: 2021-09-02 | End: 2021-10-18

## 2021-09-02 NOTE — TELEPHONE ENCOUNTER
Patient was informed of medication sent to pharmacy and referral to Dr Burgess Helms. Patient stated he has an appt with a [facility] orth[facility] provider on Tuesday and was appreciative of call.  No further needs

## 2021-09-02 NOTE — TELEPHONE ENCOUNTER
Mayo Roy DO  [facility] 11 minutes ago (10:26 AM)     Prescribed to pharmacy.     Routing comment

## 2021-09-13 ENCOUNTER — TELEPHONE (OUTPATIENT)
Dept: INTERNAL MEDICINE CLINIC | Facility: CLINIC | Age: 46
End: 2021-09-13

## 2021-09-13 DIAGNOSIS — M87.052 AVASCULAR NECROSIS OF HIP, LEFT (HCC): Primary | ICD-10-CM

## 2021-09-13 RX ORDER — ACETAMINOPHEN AND CODEINE PHOSPHATE 300; 30 MG/1; MG/1
1 TABLET ORAL EVERY 8 HOURS PRN
Qty: 30 TABLET | Refills: 0 | Status: SHIPPED | OUTPATIENT
Start: 2021-09-13 | End: 2021-10-18

## 2021-09-13 NOTE — TELEPHONE ENCOUNTER
Pt is waiting to see Dr Elizabeth Riley on 10/18, patient needs a hip replacement     Pt currently has no medication and is unable to do day to day tasks because of the pain     Pt requesting if Dr Tonny Yanez can give him a pain medication until he can see Dr Swathi Bell

## 2021-09-22 DIAGNOSIS — M47.816 LUMBAR FACET ARTHROPATHY: ICD-10-CM

## 2021-09-22 RX ORDER — FOLIC ACID 1 MG/1
1 TABLET ORAL DAILY
Qty: 90 TABLET | Refills: 1 | Status: SHIPPED | OUTPATIENT
Start: 2021-09-22

## 2021-09-22 NOTE — TELEPHONE ENCOUNTER
Failed protocol     Last refill:  FOLIC ACID 1 MG Oral Tab 90 tablet 0 2/20/2021    Sig:   Take 1 tablet by mouth once daily       LOV:   2/22/2021 Dr Unruly Fagan RTC 6 months  5.  Mild intermittent asthma in adult without complication  ACT score of 16/25 today

## 2021-09-23 RX ORDER — MELOXICAM 15 MG/1
TABLET ORAL
Qty: 30 TABLET | Refills: 0 | Status: SHIPPED | OUTPATIENT
Start: 2021-09-23 | End: 2022-06-01

## 2021-10-18 ENCOUNTER — OFFICE VISIT (OUTPATIENT)
Dept: ORTHOPEDICS CLINIC | Facility: CLINIC | Age: 46
End: 2021-10-18
Payer: COMMERCIAL

## 2021-10-18 VITALS — HEIGHT: 69 IN | HEART RATE: 106 BPM | WEIGHT: 174.63 LBS | BODY MASS INDEX: 25.86 KG/M2 | OXYGEN SATURATION: 99 %

## 2021-10-18 DIAGNOSIS — M25.559 HIP PAIN: Primary | ICD-10-CM

## 2021-10-18 PROCEDURE — 99203 OFFICE O/P NEW LOW 30 MIN: CPT | Performed by: ORTHOPAEDIC SURGERY

## 2021-10-18 PROCEDURE — 20611 DRAIN/INJ JOINT/BURSA W/US: CPT | Performed by: ORTHOPAEDIC SURGERY

## 2021-10-18 PROCEDURE — 3008F BODY MASS INDEX DOCD: CPT | Performed by: ORTHOPAEDIC SURGERY

## 2021-10-18 RX ORDER — TRIAMCINOLONE ACETONIDE 40 MG/ML
40 INJECTION, SUSPENSION INTRA-ARTICULAR; INTRAMUSCULAR ONCE
Status: COMPLETED | OUTPATIENT
Start: 2021-10-18 | End: 2021-10-18

## 2021-10-18 RX ADMIN — TRIAMCINOLONE ACETONIDE 40 MG: 40 INJECTION, SUSPENSION INTRA-ARTICULAR; INTRAMUSCULAR at 17:24:00

## 2021-10-18 NOTE — H&P
[facility] New Patient Note    CC: Patient presents with:  Hip Pain: left hip pain       HPI: This 51year old male presents today with complaints of left hip pain.   Patient states that this has been going on for about 2 months, but has more recently Take 20 mg by mouth nightly.      • LOSARTAN POTASSIUM-HCTZ 100-25 MG Oral Tab Take 1 tablet by mouth once daily 90 tablet 0   • FLUTICASONE PROPIONATE 50 MCG/ACT Nasal Suspension USE 2 SPRAY(S) IN EACH NOSTRIL IN THE EVENING 16 g 0   • ALBUTEROL SULFATE HF extremity:  • Inspection: skin intact, no lesions, no effusion  • Palpation: Nontender to palpation over the proximal lateral thigh/greater trochanter and IT band  • Range of motion: Passive hip flexion and 90 with external rotation 45, versus 60 on the ri get prescriptions from other providers. I did discuss the diagnostic and potentially therapeutic potential of an intra-articular hip steroid injection, which the patient does understand and agree with. This was performed in clinic today.   Advised the pat

## 2021-11-01 DIAGNOSIS — I10 ESSENTIAL HYPERTENSION: ICD-10-CM

## 2021-11-03 RX ORDER — LOSARTAN POTASSIUM AND HYDROCHLOROTHIAZIDE 25; 100 MG/1; MG/1
TABLET ORAL
Qty: 30 TABLET | Refills: 0 | Status: SHIPPED | OUTPATIENT
Start: 2021-11-03 | End: 2021-12-08

## 2021-11-03 NOTE — TELEPHONE ENCOUNTER
Medication(s) to Refill:   Requested Prescriptions     Pending Prescriptions Disp Refills   • LOSARTAN-HYDROCHLOROTHIAZIDE 100-25 MG Oral Tab [Pharmacy Med Name: Losartan Potassium-HCTZ 100-25 MG Oral Tablet] 90 tablet 0     Sig: Take 1 tablet by mouth onc

## 2021-11-23 RX ORDER — ATORVASTATIN CALCIUM 20 MG/1
TABLET, FILM COATED ORAL
Qty: 90 TABLET | Refills: 0 | Status: SHIPPED | OUTPATIENT
Start: 2021-11-23

## 2021-11-23 NOTE — TELEPHONE ENCOUNTER
Requesting:   Name from pharmacy: Atorvastatin Calcium 20 MG Oral Tablet          Will file in chart as: ATORVASTATIN 20 MG Oral Tab    Sig: Take 1 tablet by mouth nightly    Disp:  90 tablet    Refills:  0    Start: 11/21/2021    Class: Normal    Non-form

## 2021-12-08 DIAGNOSIS — I10 ESSENTIAL HYPERTENSION: ICD-10-CM

## 2021-12-08 DIAGNOSIS — R09.81 NASAL CONGESTION: ICD-10-CM

## 2021-12-08 RX ORDER — LOSARTAN POTASSIUM AND HYDROCHLOROTHIAZIDE 25; 100 MG/1; MG/1
1 TABLET ORAL DAILY
Qty: 90 TABLET | Refills: 0 | Status: SHIPPED | OUTPATIENT
Start: 2021-12-08

## 2021-12-08 RX ORDER — FLUTICASONE PROPIONATE 50 MCG
SPRAY, SUSPENSION (ML) NASAL
Qty: 16 G | Refills: 0 | Status: SHIPPED | OUTPATIENT
Start: 2021-12-08

## 2021-12-08 NOTE — TELEPHONE ENCOUNTER
LOV: 2/22/2021 with Dr. Janelle Montilla  RTC: 6 months  Last Relevant Labs: April 2021  Filled: 11/3/2021 (Losartan-Hydrochlorothiazide 100-25 mg)    #30 with 0 refills  2/11/2021 (Fluticasone Propionate 50 mcg Nasal Spray)     #16 g with 0 refills    No future ap

## 2022-02-08 NOTE — TELEPHONE ENCOUNTER
Given that it has been 3 mos since I have seen him, I would recommend a follow up appt to evaluate    Pt had xrays completed, would like to know the results or does he need to make appt for office visit to discuss? Please call pt to advise.

## 2022-02-18 RX ORDER — CLONIDINE HYDROCHLORIDE 0.3 MG/1
TABLET ORAL
Qty: 60 TABLET | Refills: 0 | Status: SHIPPED | OUTPATIENT
Start: 2022-02-18

## 2022-02-28 RX ORDER — ATORVASTATIN CALCIUM 20 MG/1
TABLET, FILM COATED ORAL
Qty: 30 TABLET | Refills: 0 | Status: SHIPPED | OUTPATIENT
Start: 2022-02-28 | End: 2022-04-04

## 2022-02-28 NOTE — TELEPHONE ENCOUNTER
Failed protocol     Last refill:  11/23/2021 Atorvastatin 20 mg #90 NR    LOV:   2/22/2021 Dr Aris Cristobal RTC 6 months  4. Hypercholesterolemia  Mildly elevated triglycerides/VLDL in October. Will continue atorvastatin 20 mg qhs for now.   No FOV scheduled

## 2022-03-15 RX ORDER — LOSARTAN POTASSIUM AND HYDROCHLOROTHIAZIDE 25; 100 MG/1; MG/1
TABLET ORAL
Qty: 90 TABLET | Refills: 0 | Status: SHIPPED | OUTPATIENT
Start: 2022-03-15

## 2022-03-15 NOTE — TELEPHONE ENCOUNTER
Voicemail box not set up   Yerdle sent   Due for cpx and med follow up     Protocol failed     Requesting: Losartan potassium-hydrochlorothiazide 100-25mg     LOV: 2/22/21   RTC: 6 months   Filled: 12/8/21 #90 0 refills   Recent Labs: 4/16/21     Upcoming OV: none scheduled

## 2022-03-30 RX ORDER — FOLIC ACID 1 MG/1
1 TABLET ORAL DAILY
Qty: 90 TABLET | Refills: 1 | Status: SHIPPED | OUTPATIENT
Start: 2022-03-30

## 2022-04-04 RX ORDER — ATORVASTATIN CALCIUM 20 MG/1
TABLET, FILM COATED ORAL
Qty: 90 TABLET | Refills: 0 | Status: SHIPPED | OUTPATIENT
Start: 2022-04-04

## 2022-04-25 RX ORDER — CLONIDINE HYDROCHLORIDE 0.3 MG/1
TABLET ORAL
Qty: 60 TABLET | Refills: 0 | OUTPATIENT
Start: 2022-04-25

## 2022-04-25 RX ORDER — CLONIDINE HYDROCHLORIDE 0.3 MG/1
0.3 TABLET ORAL 2 TIMES DAILY
Qty: 60 TABLET | Refills: 0 | Status: SHIPPED | OUTPATIENT
Start: 2022-04-25

## 2022-04-25 NOTE — TELEPHONE ENCOUNTER
Pt states he is currently out of rx and would like to know if Kit Cutler would send a temp refill until appt. Pt confirmed he is taking . 3mg twice daily.      Future Appointments   Date Time Provider Clement Jeannine   5/2/2022  1:00 PM Stacie Olivia MD [facility] [place]

## 2022-06-01 ENCOUNTER — OFFICE VISIT (OUTPATIENT)
Dept: INTERNAL MEDICINE CLINIC | Facility: CLINIC | Age: 47
End: 2022-06-01
Payer: COMMERCIAL

## 2022-06-01 VITALS
WEIGHT: 162.19 LBS | TEMPERATURE: 99 F | HEIGHT: 67.48 IN | OXYGEN SATURATION: 99 % | BODY MASS INDEX: 25.16 KG/M2 | HEART RATE: 98 BPM | DIASTOLIC BLOOD PRESSURE: 82 MMHG | SYSTOLIC BLOOD PRESSURE: 130 MMHG

## 2022-06-01 DIAGNOSIS — R80.9 PROTEINURIA, UNSPECIFIED TYPE: ICD-10-CM

## 2022-06-01 DIAGNOSIS — H47.293 SECONDARY OPTIC ATROPHY OF BOTH EYES: ICD-10-CM

## 2022-06-01 DIAGNOSIS — Z91.013 SHELLFISH ALLERGY: ICD-10-CM

## 2022-06-01 DIAGNOSIS — E78.00 HYPERCHOLESTEROLEMIA: ICD-10-CM

## 2022-06-01 DIAGNOSIS — R09.81 NASAL CONGESTION: ICD-10-CM

## 2022-06-01 DIAGNOSIS — I10 ESSENTIAL HYPERTENSION: Primary | ICD-10-CM

## 2022-06-01 DIAGNOSIS — J45.20 MILD INTERMITTENT ASTHMA IN ADULT WITHOUT COMPLICATION: ICD-10-CM

## 2022-06-01 DIAGNOSIS — Z12.5 SCREENING FOR MALIGNANT NEOPLASM OF PROSTATE: ICD-10-CM

## 2022-06-01 DIAGNOSIS — Z00.00 PREVENTATIVE HEALTH CARE: ICD-10-CM

## 2022-06-01 PROCEDURE — 3079F DIAST BP 80-89 MM HG: CPT | Performed by: INTERNAL MEDICINE

## 2022-06-01 PROCEDURE — 3075F SYST BP GE 130 - 139MM HG: CPT | Performed by: INTERNAL MEDICINE

## 2022-06-01 PROCEDURE — 3008F BODY MASS INDEX DOCD: CPT | Performed by: INTERNAL MEDICINE

## 2022-06-01 PROCEDURE — 99214 OFFICE O/P EST MOD 30 MIN: CPT | Performed by: INTERNAL MEDICINE

## 2022-06-01 RX ORDER — MELATONIN
1000 DAILY
COMMUNITY

## 2022-06-01 RX ORDER — CLONIDINE HYDROCHLORIDE 0.3 MG/1
0.3 TABLET ORAL 2 TIMES DAILY
Qty: 180 TABLET | Refills: 1 | Status: SHIPPED | OUTPATIENT
Start: 2022-06-01

## 2022-06-01 RX ORDER — FLUTICASONE PROPIONATE 50 MCG
2 SPRAY, SUSPENSION (ML) NASAL EVERY EVENING
Qty: 16 G | Refills: 1 | Status: SHIPPED | OUTPATIENT
Start: 2022-06-01

## 2022-06-01 RX ORDER — FOLIC ACID 1 MG/1
1 TABLET ORAL DAILY
Qty: 90 TABLET | Refills: 1 | Status: SHIPPED | OUTPATIENT
Start: 2022-06-01

## 2022-06-01 RX ORDER — EPINEPHRINE 0.3 MG/.3ML
0.3 INJECTION SUBCUTANEOUS AS NEEDED
Qty: 2 EACH | Refills: 0 | Status: SHIPPED | OUTPATIENT
Start: 2022-06-01

## 2022-06-01 RX ORDER — ATORVASTATIN CALCIUM 20 MG/1
20 TABLET, FILM COATED ORAL NIGHTLY
Qty: 90 TABLET | Refills: 1 | Status: SHIPPED | OUTPATIENT
Start: 2022-06-01

## 2022-06-01 RX ORDER — LOSARTAN POTASSIUM AND HYDROCHLOROTHIAZIDE 25; 100 MG/1; MG/1
1 TABLET ORAL DAILY
Qty: 90 TABLET | Refills: 1 | Status: SHIPPED | OUTPATIENT
Start: 2022-06-01

## 2022-06-01 NOTE — PATIENT INSTRUCTIONS
- Continue current medications. Refills sent to [facility]. - Get blood tests done when fasting (water and medications only for 8 hours). You can schedule a lab visit through [address] [place] [address] [address], [address] [place] [address]. org, or call [phone].   - Follow up with Dr. Justin Hwang (kidney doctor) after you get blood and urine tests done  [address] [facility], [address] [place] [address] Rd  [address] [facility]  [address] [facility]  [phone]  - Follow up with me in 6-12 months for chronic issues; follow up earlier as needed. It was a pleasure seeing you in the clinic today. Thank you for choosing the [facility] office for your healthcare needs. Please call at [phone] with any questions or concerns.     Donnell Orozco MD

## 2022-06-05 DIAGNOSIS — R09.81 NASAL CONGESTION: ICD-10-CM

## 2022-06-06 RX ORDER — AZELASTINE HYDROCHLORIDE 137 UG/1
1 SPRAY, METERED NASAL 2 TIMES DAILY
Qty: 30 ML | Refills: 2 | Status: SHIPPED | OUTPATIENT
Start: 2022-06-06

## 2022-06-06 NOTE — TELEPHONE ENCOUNTER
Protocol Passed    Medication Requested:   Azelastine HCl 0.1 % Nasal Solution (Discontinued) 08/20/21  Filled: 02/22/21 #30mL w/ 1 refill   LOV: 06/01/22 w/ Dr. Makayla Valdez    RTC: 12/01/22   FOV: not on file   Recent Labs: 04/16/21

## 2023-01-05 ENCOUNTER — TELEPHONE (OUTPATIENT)
Dept: INTERNAL MEDICINE CLINIC | Facility: CLINIC | Age: 48
End: 2023-01-05

## 2023-01-05 DIAGNOSIS — R09.81 NASAL CONGESTION: ICD-10-CM

## 2023-01-05 RX ORDER — FLUTICASONE PROPIONATE 50 MCG
SPRAY, SUSPENSION (ML) NASAL
Qty: 16 G | Refills: 0 | Status: SHIPPED | OUTPATIENT
Start: 2023-01-05

## 2023-01-05 NOTE — TELEPHONE ENCOUNTER
Pt was recently d/ c from [facility] due to heart attack. Pt stated he believes he had a 90% artery blockage. Pt fears another heart attack, needs to see Dr. Lucetta Ahumada for a HFU and referral to cardiologist to get a stent put in. Would you like to place referral w/out visit or overbook pt?      Future Appointments   Date Time Provider Clement Paez   1/12/2023  3:00 PM Efra Robledo MD [facility] [place]

## 2023-01-05 NOTE — TELEPHONE ENCOUNTER
He can keep HFU appointment with me for next week but should schedule follow up with Cardiology asap. He should bring his discharge summary/paperwork to cardiologist appointment. Should return to ED with any severe/constant chest pain.   Also can we request records from Critical access hospital?    [facility] Cardiology  Dr. Wei Long, Stewart Carpenter, others  [address]  [address] [facility], [address]  Phone: [phone] Cardiology  Dr. Lorie Moses, others  [address]  [address], [address] [place] [address]  [phone]

## 2023-01-05 NOTE — TELEPHONE ENCOUNTER
Meera Rodrigez w/[facility] is calling because they don't have a discharge summary on file because the patient is till listed as inpatient in their system. He is going to send the ER records that he has available. The Lorazepam can be taken in then morning; for now the Seroquel should only be taken at night

## 2023-01-05 NOTE — TELEPHONE ENCOUNTER
TYREE    Spoke with patient, advised of RP recommendations. Provided #'s for cardiologists. Patient does not have DC summary as he left AMA. States \"they were padding the bill\" so he decided to leave and f/u with RP for recs. Faxed request for ER records to [facility] [phone].

## 2023-01-06 ENCOUNTER — TELEPHONE (OUTPATIENT)
Dept: INTERNAL MEDICINE CLINIC | Facility: CLINIC | Age: 48
End: 2023-01-06

## 2023-01-06 NOTE — TELEPHONE ENCOUNTER
Incoming fax from [facility] of Emergency room records. Placed in Dr Moon Guillermo in basket for him to review.

## 2023-01-08 RX ORDER — CLOPIDOGREL BISULFATE 75 MG/1
TABLET ORAL
COMMUNITY
Start: 2023-01-04

## 2023-01-08 RX ORDER — ISOSORBIDE MONONITRATE 60 MG/1
TABLET, EXTENDED RELEASE ORAL
COMMUNITY
Start: 2023-01-05

## 2023-01-08 RX ORDER — METOPROLOL TARTRATE 50 MG/1
TABLET, FILM COATED ORAL
COMMUNITY
Start: 2023-01-05

## 2023-01-08 RX ORDER — NIFEDIPINE 60 MG/1
60 TABLET, EXTENDED RELEASE ORAL DAILY
COMMUNITY
Start: 2022-12-15

## 2023-01-08 RX ORDER — ATORVASTATIN CALCIUM 80 MG/1
TABLET, FILM COATED ORAL
COMMUNITY
Start: 2023-01-05

## 2023-01-08 NOTE — TELEPHONE ENCOUNTER
Records reviewed  Patient presented to [facility] ED with chest pain. Based on EKG and symptoms code STEMI was called. Based on outside medication prescriptions it appears patient is now on high dose atorvastatin, clopidogrel, metoprolol tartrate, isosorbide.

## 2023-01-12 ENCOUNTER — OFFICE VISIT (OUTPATIENT)
Dept: INTERNAL MEDICINE CLINIC | Facility: CLINIC | Age: 48
End: 2023-01-12

## 2023-01-12 VITALS
OXYGEN SATURATION: 99 % | HEART RATE: 66 BPM | TEMPERATURE: 98 F | WEIGHT: 175.81 LBS | RESPIRATION RATE: 16 BRPM | SYSTOLIC BLOOD PRESSURE: 130 MMHG | HEIGHT: 67 IN | DIASTOLIC BLOOD PRESSURE: 80 MMHG | BODY MASS INDEX: 27.6 KG/M2

## 2023-01-12 DIAGNOSIS — F41.0 PANIC ATTACKS: ICD-10-CM

## 2023-01-12 DIAGNOSIS — E78.00 HYPERCHOLESTEROLEMIA: ICD-10-CM

## 2023-01-12 DIAGNOSIS — I10 PRIMARY HYPERTENSION: ICD-10-CM

## 2023-01-12 DIAGNOSIS — R94.31 ABNORMAL EKG: Primary | ICD-10-CM

## 2023-01-12 PROCEDURE — 99214 OFFICE O/P EST MOD 30 MIN: CPT | Performed by: INTERNAL MEDICINE

## 2023-01-12 RX ORDER — ISOSORBIDE MONONITRATE 60 MG/1
60 TABLET, EXTENDED RELEASE ORAL DAILY
Qty: 90 TABLET | Refills: 1 | Status: SHIPPED | OUTPATIENT
Start: 2023-01-12

## 2023-01-12 RX ORDER — CARVEDILOL 12.5 MG/1
12.5 TABLET ORAL 2 TIMES DAILY WITH MEALS
COMMUNITY

## 2023-01-12 RX ORDER — CLONIDINE HYDROCHLORIDE 0.3 MG/1
0.3 TABLET ORAL 2 TIMES DAILY
Qty: 180 TABLET | Refills: 1 | Status: SHIPPED | OUTPATIENT
Start: 2023-01-12

## 2023-01-12 RX ORDER — LOSARTAN POTASSIUM AND HYDROCHLOROTHIAZIDE 25; 100 MG/1; MG/1
1 TABLET ORAL DAILY
Qty: 90 TABLET | Refills: 1 | Status: SHIPPED | OUTPATIENT
Start: 2023-01-12

## 2023-01-12 RX ORDER — ALPRAZOLAM 0.25 MG/1
0.25 TABLET ORAL DAILY PRN
Qty: 10 TABLET | Refills: 0 | Status: SHIPPED | OUTPATIENT
Start: 2023-01-12

## 2023-01-12 NOTE — PATIENT INSTRUCTIONS
- Losartan-HCTZ, clonidine, nifedipine refilled (heart/blood pressure medications)  - Will see what Dr. Dimitris Ramos says about the clopidogrel (blood thinner)  - Will request records from [facility]  - I sent in a prescription for alprazolam (Xanax). Take 1 tablet as needed for panic attacks. If you have a lot of panic attacks we may consider a daily anxiety medication.  - Get blood tests done at [facility]. It was a pleasure seeing you in the clinic today. Thank you for choosing the [facility] office for your healthcare needs. Please call at [phone] with any questions or concerns.     Maryuri Saeed MD

## 2023-01-17 ENCOUNTER — TELEPHONE (OUTPATIENT)
Dept: INTERNAL MEDICINE CLINIC | Facility: CLINIC | Age: 48
End: 2023-01-17

## 2023-04-10 NOTE — TELEPHONE ENCOUNTER
Detail Level: Generalized Pt being seen for left hip pain after a fall. Xrays and MRI in patient's chart. No new imaging needed at this time. Detail Level: Zone Detail Level: Detailed

## 2023-07-24 DIAGNOSIS — I10 PRIMARY HYPERTENSION: ICD-10-CM

## 2023-07-24 RX ORDER — CLONIDINE HYDROCHLORIDE 0.3 MG/1
0.3 TABLET ORAL 2 TIMES DAILY
Qty: 180 TABLET | Refills: 0 | OUTPATIENT
Start: 2023-07-24

## 2023-09-05 DIAGNOSIS — I10 PRIMARY HYPERTENSION: ICD-10-CM

## 2023-09-06 RX ORDER — CLONIDINE HYDROCHLORIDE 0.3 MG/1
0.3 TABLET ORAL 2 TIMES DAILY
Qty: 180 TABLET | Refills: 0 | Status: SHIPPED | OUTPATIENT
Start: 2023-09-06

## 2023-09-06 RX ORDER — LOSARTAN POTASSIUM AND HYDROCHLOROTHIAZIDE 25; 100 MG/1; MG/1
1 TABLET ORAL DAILY
Qty: 90 TABLET | Refills: 0 | Status: SHIPPED | OUTPATIENT
Start: 2023-09-06

## 2023-09-06 RX ORDER — ISOSORBIDE MONONITRATE 60 MG/1
60 TABLET, EXTENDED RELEASE ORAL DAILY
Qty: 90 TABLET | Refills: 0 | Status: SHIPPED | OUTPATIENT
Start: 2023-09-06

## 2023-09-06 NOTE — TELEPHONE ENCOUNTER
PROTOCOL FAILED   Name from pharmacy: cloNIDine HCl 0.3 MG Oral Tablet         Will file in chart as: CLONIDINE 0.3 MG Oral Tab    Sig: Take 1 tablet by mouth twice daily    Disp: 180 tablet    Refills: 0    Start: 9/5/2023    Class: Normal    For: Primary hypertension    Last ordered: 7 months ago by Mere Wang MD Last refill: 4/9/2023    Rx #: [id number]    Hypertension Medications Protocol [id number]/05/2023 03:04 PM   Protocol Details CMP or BMP in past 12 months    Appointment in past 6 or next 3 months    Last serum creatinine< 2.0       Name from pharmacy: Losartan Potassium-HCTZ 100-25 MG Oral Tablet         Will file in chart as: LOSARTAN-HYDROCHLOROTHIAZIDE 100-25 MG Oral Tab    Sig: Take 1 tablet by mouth once daily    Disp: 90 tablet    Refills: 0    Start: 9/5/2023    Class: Normal    For: Primary hypertension    Last ordered: 7 months ago by Mere Wang MD Last refill: 4/9/2023    Rx #: [id number]    Hypertension Medications Protocol [id number]/05/2023 03:04 PM   Protocol Details CMP or BMP in past 12 months    Appointment in past 6 or next 3 months    Last serum creatinine< 2.0      To be filled at: [address] [facility], [address] [place] [address] [phone], [address] [place] [address]    Name from pharmacy: Isosorbide Mononitrate ER 60 MG Oral Tablet Extended Release 24 Hour         Will file in chart as: ISOSORBIDE MONONITRATE ER 60 MG Oral Tablet 24 Hr    Sig: Take 1 tablet by mouth once daily    Disp: 90 tablet    Refills: 0    Start: 9/5/2023    Class: Normal    Non-formulary For: Primary hypertension    Last ordered: 7 months ago by Mere Wang MD Last refill: 4/9/2023    Rx #: [id number]     LOV: 01/12/23 w/ RP   RTC: No Follow Up on File   RECENT LABS: No recent labs   FOV: No FOV

## 2024-01-15 DIAGNOSIS — I10 PRIMARY HYPERTENSION: ICD-10-CM

## 2024-01-15 RX ORDER — ISOSORBIDE MONONITRATE 60 MG/1
60 TABLET, EXTENDED RELEASE ORAL DAILY
Qty: 90 TABLET | Refills: 0 | Status: SHIPPED | OUTPATIENT
Start: 2024-01-15

## 2024-01-15 NOTE — TELEPHONE ENCOUNTER
LOV: 1/12/2023 with Dr. Andres  RTC: 6 months  Last Relevant Labs: 4/16/2021  Filled: 9/6/2023    #90 with 0 refills    No future appointments.

## 2024-01-27 ENCOUNTER — APPOINTMENT (OUTPATIENT)
Dept: CV DIAGNOSTICS | Facility: HOSPITAL | Age: 49
End: 2024-01-27
Attending: INTERNAL MEDICINE
Payer: COMMERCIAL

## 2024-01-27 ENCOUNTER — APPOINTMENT (OUTPATIENT)
Dept: GENERAL RADIOLOGY | Age: 49
End: 2024-01-27
Attending: EMERGENCY MEDICINE
Payer: COMMERCIAL

## 2024-01-27 ENCOUNTER — APPOINTMENT (OUTPATIENT)
Dept: INTERVENTIONAL RADIOLOGY/VASCULAR | Facility: HOSPITAL | Age: 49
End: 2024-01-27
Attending: INTERNAL MEDICINE
Payer: COMMERCIAL

## 2024-01-27 ENCOUNTER — HOSPITAL ENCOUNTER (INPATIENT)
Facility: HOSPITAL | Age: 49
LOS: 1 days | Discharge: HOME | End: 2024-01-28
Attending: EMERGENCY MEDICINE | Admitting: HOSPITALIST
Payer: COMMERCIAL

## 2024-01-27 DIAGNOSIS — I16.1 HYPERTENSIVE EMERGENCY: ICD-10-CM

## 2024-01-27 DIAGNOSIS — I21.3 ST ELEVATION MYOCARDIAL INFARCTION (STEMI), UNSPECIFIED ARTERY (HCC): Primary | ICD-10-CM

## 2024-01-27 DIAGNOSIS — E78.00 HYPERCHOLESTEROLEMIA: ICD-10-CM

## 2024-01-27 PROBLEM — I25.110 CORONARY ARTERY DISEASE INVOLVING NATIVE CORONARY ARTERY OF NATIVE HEART WITH UNSTABLE ANGINA PECTORIS (HCC): Status: ACTIVE | Noted: 2024-01-27

## 2024-01-27 LAB
ADENOVIRUS PCR:: NOT DETECTED
ALBUMIN SERPL-MCNC: 3.7 G/DL (ref 3.4–5)
ALBUMIN/GLOB SERPL: 1.1 {RATIO} (ref 1–2)
ALP LIVER SERPL-CCNC: 40 U/L
ANION GAP SERPL CALC-SCNC: 6 MMOL/L (ref 0–18)
APTT PPP: 27.3 SECONDS (ref 23.3–35.6)
AST SERPL-CCNC: 59 U/L (ref 15–37)
ATRIAL RATE: 61 BPM
ATRIAL RATE: 63 BPM
B PARAPERT DNA SPEC QL NAA+PROBE: NOT DETECTED
B PERT DNA SPEC QL NAA+PROBE: NOT DETECTED
BASOPHILS # BLD AUTO: 0.06 X10(3) UL (ref 0–0.2)
BASOPHILS NFR BLD AUTO: 1.1 %
BILIRUB SERPL-MCNC: 0.6 MG/DL (ref 0.1–2)
BUN BLD-MCNC: 7 MG/DL (ref 9–23)
C PNEUM DNA SPEC QL NAA+PROBE: NOT DETECTED
CALCIUM BLD-MCNC: 9.2 MG/DL (ref 8.5–10.1)
CHLORIDE SERPL-SCNC: 108 MMOL/L (ref 98–112)
CO2 SERPL-SCNC: 28 MMOL/L (ref 21–32)
CORONAVIRUS 229E PCR:: NOT DETECTED
CORONAVIRUS HKU1 PCR:: NOT DETECTED
CORONAVIRUS NL63 PCR:: NOT DETECTED
CORONAVIRUS OC43 PCR:: NOT DETECTED
CREAT BLD-MCNC: 0.91 MG/DL
EGFRCR SERPLBLD CKD-EPI 2021: 103 ML/MIN/1.73M2 (ref 60–?)
EOSINOPHIL # BLD AUTO: 0.17 X10(3) UL (ref 0–0.7)
EOSINOPHIL NFR BLD AUTO: 3.1 %
ERYTHROCYTE [DISTWIDTH] IN BLOOD BY AUTOMATED COUNT: 12.3 %
FLUAV RNA SPEC QL NAA+PROBE: NOT DETECTED
FLUBV RNA SPEC QL NAA+PROBE: NOT DETECTED
GLOBULIN PLAS-MCNC: 3.5 G/DL (ref 2.8–4.4)
GLUCOSE BLD-MCNC: 102 MG/DL (ref 70–99)
HCT VFR BLD AUTO: 43.1 %
HGB BLD-MCNC: 15.5 G/DL
IMM GRANULOCYTES # BLD AUTO: 0.02 X10(3) UL (ref 0–1)
IMM GRANULOCYTES NFR BLD: 0.4 %
INR BLD: 0.89 (ref 0.8–1.2)
LYMPHOCYTES # BLD AUTO: 0.96 X10(3) UL (ref 1–4)
LYMPHOCYTES NFR BLD AUTO: 17.5 %
MCH RBC QN AUTO: 32.7 PG (ref 26–34)
MCHC RBC AUTO-ENTMCNC: 36 G/DL (ref 31–37)
MCV RBC AUTO: 90.9 FL
METAPNEUMOVIRUS PCR:: NOT DETECTED
MONOCYTES # BLD AUTO: 0.51 X10(3) UL (ref 0.1–1)
MONOCYTES NFR BLD AUTO: 9.3 %
MYCOPLASMA PNEUMONIA PCR:: NOT DETECTED
NEUTROPHILS # BLD AUTO: 3.78 X10 (3) UL (ref 1.5–7.7)
NEUTROPHILS # BLD AUTO: 3.78 X10(3) UL (ref 1.5–7.7)
NEUTROPHILS NFR BLD AUTO: 68.6 %
NT-PROBNP SERPL-MCNC: 250 PG/ML (ref ?–125)
OSMOLALITY SERPL CALC.SUM OF ELEC: 292 MOSM/KG (ref 275–295)
P AXIS: 10 DEGREES
P AXIS: 4 DEGREES
P-R INTERVAL: 138 MS
P-R INTERVAL: 142 MS
PARAINFLUENZA 1 PCR:: NOT DETECTED
PARAINFLUENZA 2 PCR:: NOT DETECTED
PARAINFLUENZA 3 PCR:: NOT DETECTED
PARAINFLUENZA 4 PCR:: NOT DETECTED
PLATELET # BLD AUTO: 164 10(3)UL (ref 150–450)
POTASSIUM SERPL-SCNC: 3.5 MMOL/L (ref 3.5–5.1)
PROT SERPL-MCNC: 7.2 G/DL (ref 6.4–8.2)
PROTHROMBIN TIME: 12.1 SECONDS (ref 11.6–14.8)
Q-T INTERVAL: 406 MS
Q-T INTERVAL: 420 MS
QRS DURATION: 90 MS
QRS DURATION: 90 MS
QTC CALCULATION (BEZET): 415 MS
QTC CALCULATION (BEZET): 422 MS
R AXIS: -34 DEGREES
R AXIS: -42 DEGREES
RBC # BLD AUTO: 4.74 X10(6)UL
RHINOVIRUS/ENTERO PCR:: NOT DETECTED
RSV RNA SPEC QL NAA+PROBE: NOT DETECTED
SARS-COV-2 RNA NPH QL NAA+NON-PROBE: NOT DETECTED
SODIUM SERPL-SCNC: 142 MMOL/L (ref 136–145)
T AXIS: -25 DEGREES
T AXIS: -37 DEGREES
TROPONIN I SERPL HS-MCNC: 13 NG/L
TROPONIN I SERPL HS-MCNC: 27 NG/L
TROPONIN I SERPL HS-MCNC: 36 NG/L
TROPONIN I SERPL HS-MCNC: 50 NG/L
VENTRICULAR RATE: 61 BPM
VENTRICULAR RATE: 63 BPM
WBC # BLD AUTO: 5.5 X10(3) UL (ref 4–11)

## 2024-01-27 PROCEDURE — 027034Z DILATION OF CORONARY ARTERY, ONE ARTERY WITH DRUG-ELUTING INTRALUMINAL DEVICE, PERCUTANEOUS APPROACH: ICD-10-PCS | Performed by: INTERNAL MEDICINE

## 2024-01-27 PROCEDURE — 71045 X-RAY EXAM CHEST 1 VIEW: CPT | Performed by: EMERGENCY MEDICINE

## 2024-01-27 PROCEDURE — 99291 CRITICAL CARE FIRST HOUR: CPT | Performed by: HOSPITALIST

## 2024-01-27 PROCEDURE — B2111ZZ FLUOROSCOPY OF MULTIPLE CORONARY ARTERIES USING LOW OSMOLAR CONTRAST: ICD-10-PCS | Performed by: INTERNAL MEDICINE

## 2024-01-27 PROCEDURE — 4A023N7 MEASUREMENT OF CARDIAC SAMPLING AND PRESSURE, LEFT HEART, PERCUTANEOUS APPROACH: ICD-10-PCS | Performed by: INTERNAL MEDICINE

## 2024-01-27 PROCEDURE — B2151ZZ FLUOROSCOPY OF LEFT HEART USING LOW OSMOLAR CONTRAST: ICD-10-PCS | Performed by: INTERNAL MEDICINE

## 2024-01-27 RX ORDER — NITROGLYCERIN 20 MG/100ML
INJECTION INTRAVENOUS
Status: COMPLETED
Start: 2024-01-27 | End: 2024-01-27

## 2024-01-27 RX ORDER — ALPRAZOLAM 0.25 MG/1
0.25 TABLET ORAL 3 TIMES DAILY PRN
Status: DISCONTINUED | OUTPATIENT
Start: 2024-01-27 | End: 2024-01-28

## 2024-01-27 RX ORDER — FUROSEMIDE 10 MG/ML
40 INJECTION INTRAMUSCULAR; INTRAVENOUS ONCE
Status: DISCONTINUED | OUTPATIENT
Start: 2024-01-27 | End: 2024-01-28

## 2024-01-27 RX ORDER — MORPHINE SULFATE 2 MG/ML
1 INJECTION, SOLUTION INTRAMUSCULAR; INTRAVENOUS EVERY 2 HOUR PRN
Status: DISCONTINUED | OUTPATIENT
Start: 2024-01-27 | End: 2024-01-28

## 2024-01-27 RX ORDER — ASPIRIN 81 MG/1
81 TABLET ORAL DAILY
Status: DISCONTINUED | OUTPATIENT
Start: 2024-01-28 | End: 2024-01-28

## 2024-01-27 RX ORDER — ISOSORBIDE MONONITRATE 60 MG/1
60 TABLET, EXTENDED RELEASE ORAL DAILY
Status: CANCELLED | OUTPATIENT
Start: 2024-01-27

## 2024-01-27 RX ORDER — NIFEDIPINE 30 MG/1
60 TABLET, EXTENDED RELEASE ORAL DAILY
Status: DISCONTINUED | OUTPATIENT
Start: 2024-01-27 | End: 2024-01-28

## 2024-01-27 RX ORDER — PROCHLORPERAZINE EDISYLATE 5 MG/ML
5 INJECTION INTRAMUSCULAR; INTRAVENOUS EVERY 8 HOURS PRN
Status: DISCONTINUED | OUTPATIENT
Start: 2024-01-27 | End: 2024-01-28

## 2024-01-27 RX ORDER — ATORVASTATIN CALCIUM 20 MG/1
20 TABLET, FILM COATED ORAL NIGHTLY
Status: DISCONTINUED | OUTPATIENT
Start: 2024-01-27 | End: 2024-01-27

## 2024-01-27 RX ORDER — FOLIC ACID 1 MG/1
1 TABLET ORAL NIGHTLY
Status: DISCONTINUED | OUTPATIENT
Start: 2024-01-27 | End: 2024-01-28

## 2024-01-27 RX ORDER — FLUTICASONE PROPIONATE 50 MCG
2 SPRAY, SUSPENSION (ML) NASAL EVERY EVENING
Status: DISCONTINUED | OUTPATIENT
Start: 2024-01-27 | End: 2024-01-28

## 2024-01-27 RX ORDER — CARVEDILOL 12.5 MG/1
12.5 TABLET ORAL 2 TIMES DAILY WITH MEALS
Status: DISCONTINUED | OUTPATIENT
Start: 2024-01-27 | End: 2024-01-28

## 2024-01-27 RX ORDER — MORPHINE SULFATE 4 MG/ML
4 INJECTION, SOLUTION INTRAMUSCULAR; INTRAVENOUS EVERY 2 HOUR PRN
Status: DISCONTINUED | OUTPATIENT
Start: 2024-01-27 | End: 2024-01-28

## 2024-01-27 RX ORDER — MORPHINE SULFATE 4 MG/ML
2 INJECTION, SOLUTION INTRAMUSCULAR; INTRAVENOUS ONCE
Status: COMPLETED | OUTPATIENT
Start: 2024-01-27 | End: 2024-01-27

## 2024-01-27 RX ORDER — LIDOCAINE HYDROCHLORIDE 10 MG/ML
INJECTION, SOLUTION EPIDURAL; INFILTRATION; INTRACAUDAL; PERINEURAL
Status: COMPLETED
Start: 2024-01-27 | End: 2024-01-27

## 2024-01-27 RX ORDER — ASPIRIN 325 MG
325 TABLET ORAL DAILY
Status: DISCONTINUED | OUTPATIENT
Start: 2024-01-27 | End: 2024-01-27

## 2024-01-27 RX ORDER — MIDAZOLAM HYDROCHLORIDE 1 MG/ML
INJECTION INTRAMUSCULAR; INTRAVENOUS
Status: COMPLETED
Start: 2024-01-27 | End: 2024-01-27

## 2024-01-27 RX ORDER — MORPHINE SULFATE 2 MG/ML
2 INJECTION, SOLUTION INTRAMUSCULAR; INTRAVENOUS EVERY 2 HOUR PRN
Status: DISCONTINUED | OUTPATIENT
Start: 2024-01-27 | End: 2024-01-28

## 2024-01-27 RX ORDER — CLOPIDOGREL BISULFATE 75 MG/1
75 TABLET ORAL DAILY
Status: DISCONTINUED | OUTPATIENT
Start: 2024-01-28 | End: 2024-01-28

## 2024-01-27 RX ORDER — ATORVASTATIN CALCIUM 40 MG/1
80 TABLET, FILM COATED ORAL NIGHTLY
Status: DISCONTINUED | OUTPATIENT
Start: 2024-01-27 | End: 2024-01-28

## 2024-01-27 RX ORDER — MELATONIN
3 NIGHTLY PRN
Status: DISCONTINUED | OUTPATIENT
Start: 2024-01-27 | End: 2024-01-28

## 2024-01-27 RX ORDER — ASPIRIN 81 MG/1
324 TABLET, CHEWABLE ORAL ONCE
Status: DISCONTINUED | OUTPATIENT
Start: 2024-01-27 | End: 2024-01-28 | Stop reason: ALTCHOICE

## 2024-01-27 RX ORDER — CLOPIDOGREL BISULFATE 75 MG/1
TABLET ORAL
Status: COMPLETED
Start: 2024-01-27 | End: 2024-01-27

## 2024-01-27 RX ORDER — SODIUM CHLORIDE 9 MG/ML
INJECTION, SOLUTION INTRAVENOUS CONTINUOUS
Status: ACTIVE | OUTPATIENT
Start: 2024-01-27 | End: 2024-01-27

## 2024-01-27 RX ORDER — NITROGLYCERIN 20 MG/100ML
INJECTION INTRAVENOUS CONTINUOUS
Status: DISCONTINUED | OUTPATIENT
Start: 2024-01-27 | End: 2024-01-28

## 2024-01-27 RX ORDER — HYDRALAZINE HYDROCHLORIDE 20 MG/ML
25 INJECTION INTRAMUSCULAR; INTRAVENOUS ONCE
Status: COMPLETED | OUTPATIENT
Start: 2024-01-27 | End: 2024-01-27

## 2024-01-27 RX ORDER — BISACODYL 10 MG
10 SUPPOSITORY, RECTAL RECTAL
Status: DISCONTINUED | OUTPATIENT
Start: 2024-01-27 | End: 2024-01-28

## 2024-01-27 RX ORDER — SENNOSIDES 8.6 MG
17.2 TABLET ORAL NIGHTLY PRN
Status: DISCONTINUED | OUTPATIENT
Start: 2024-01-27 | End: 2024-01-28

## 2024-01-27 RX ORDER — HEPARIN SODIUM 5000 [USP'U]/ML
INJECTION, SOLUTION INTRAVENOUS; SUBCUTANEOUS
Status: COMPLETED
Start: 2024-01-27 | End: 2024-01-27

## 2024-01-27 RX ORDER — LORAZEPAM 0.5 MG/1
0.5 TABLET ORAL ONCE
Status: COMPLETED | OUTPATIENT
Start: 2024-01-27 | End: 2024-01-27

## 2024-01-27 RX ORDER — ONDANSETRON 2 MG/ML
4 INJECTION INTRAMUSCULAR; INTRAVENOUS EVERY 6 HOURS PRN
Status: DISCONTINUED | OUTPATIENT
Start: 2024-01-27 | End: 2024-01-28

## 2024-01-27 RX ORDER — POLYETHYLENE GLYCOL 3350 17 G/17G
17 POWDER, FOR SOLUTION ORAL DAILY PRN
Status: DISCONTINUED | OUTPATIENT
Start: 2024-01-27 | End: 2024-01-28

## 2024-01-27 RX ORDER — IODIXANOL 320 MG/ML
250 INJECTION, SOLUTION INTRAVASCULAR
Status: COMPLETED | OUTPATIENT
Start: 2024-01-27 | End: 2024-01-27

## 2024-01-27 RX ORDER — MORPHINE SULFATE 4 MG/ML
INJECTION, SOLUTION INTRAMUSCULAR; INTRAVENOUS
Status: COMPLETED
Start: 2024-01-27 | End: 2024-01-27

## 2024-01-27 RX ORDER — VERAPAMIL HYDROCHLORIDE 2.5 MG/ML
INJECTION, SOLUTION INTRAVENOUS
Status: COMPLETED
Start: 2024-01-27 | End: 2024-01-27

## 2024-01-27 RX ORDER — ACETAMINOPHEN 500 MG
500 TABLET ORAL EVERY 4 HOURS PRN
Status: DISCONTINUED | OUTPATIENT
Start: 2024-01-27 | End: 2024-01-28

## 2024-01-27 RX ORDER — ENEMA 19; 7 G/133ML; G/133ML
1 ENEMA RECTAL ONCE AS NEEDED
Status: DISCONTINUED | OUTPATIENT
Start: 2024-01-27 | End: 2024-01-28

## 2024-01-27 RX ORDER — NITROGLYCERIN 0.4 MG/1
TABLET SUBLINGUAL
Status: COMPLETED
Start: 2024-01-27 | End: 2024-01-27

## 2024-01-27 RX ORDER — POTASSIUM CHLORIDE 20 MEQ/1
40 TABLET, EXTENDED RELEASE ORAL EVERY 4 HOURS
Status: COMPLETED | OUTPATIENT
Start: 2024-01-27 | End: 2024-01-27

## 2024-01-27 RX ORDER — HEPARIN SODIUM AND DEXTROSE 10000; 5 [USP'U]/100ML; G/100ML
INJECTION INTRAVENOUS
Status: DISPENSED
Start: 2024-01-27 | End: 2024-01-27
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
## 2024-01-27 NOTE — Clinical Note
Date:1/27/2024  Patient:Salvador Becker  Attending Provider:Yo Gutierrez DO    Salvador Becker was transferred to [facility] emergency department because cardiac alert. Dr Diaz was notified.  Condition at time of transfer was Critical.

## 2024-01-27 NOTE — PROCEDURES
[facility] Cardiology  Cardiac Catheterization Report    (S): Darwin Hunter MD    PREOPERATIVE DIAGNOSIS: Hypertensive crisis; coronary artery disease    POSTOPERATIVE DIAGNOSIS: Coronary artery disease    PROCEDURE PERFORMED: Left heart catheterization with selective coronary arteriography     CONSENT: The risks, benefits, and alternatives of cardiac catheterization were discussed with the patient.  The risks included, but were not limited to: bleeding, limb ischemia, deep venous thrombosis, allergic reaction, infection, stroke, myocardial infarction,  and death.  Benefits of the procedure included: symptomatic improvement, diagnosis of heart disease and prevention of myocardial infarction.  Alternatives to the procedure included: not performing cardiac catheterization, treatment with medications only, and observation. The patient verbalized understanding and agreed to proceed with the procedure.     CATHETERS: 6F JL 3.5, JR4; Angled Pigtail; PCI: 6F XB 3.5    VASCULAR CLOSURE: TR Band    SEDATION: 4 mg Versed, 100 mcg Fentanyl     DESCRIPTION OF PROCEDURE:  The patient was brought to the cardiac catheterization laboratory.      Once local anesthesia was achieved, sedation was administered. The IV was maintained by the RN and moderate conscious sedation with Versed and Fentanyl was given. The patient was assessed and monitoring of oxygen, heart rate and blood pressure by nurse and myself during the exam 1300 (time of 1st dose administered when I was present) to 1350 (procedure end time).     The right radial area was prepped and draped in a sterile manner and anesthetized with 2% lidocaine.  The right radial artery was accessed, and a 6-[place] Glidesheath was placed under modified seldinger technique.  Left and right selective coronary angiography was performed using the above catheters.  A Pigtail catheter was used to cross the aortic valve, measure left ventricular pressure.  At the conclusion  of the study, the right radial artery hemostasis was performed using a radial band.       FINDINGS:      HEMODYNAMICS:    The left ventricular end diastolic pressure is 29. There was no significant gradient upon pullback across the aortic valve.     ANGIOGRAPHY:      Left main: Large vessel giving rise to the LAD and circumflex. Angiographically normal.    LAD: Large Type IV vessel that wraps the apex and to the inferior wall. There is a severe 90% focal distal LAD lesion.    Left circumflex: Large vessel giving rise to a large OM branch. Mild disease the bifurcation.    RCA: Large vessel giving rise to the PDA and PL branches. Mild 30% mid RCA lesion.    CONCLUSIONS:  1. Severe single vessel disease as described above.  2. Successful PCI of the distal LAD with a [place] 2.5 x 15 mm PRASHANTH.  3. Severely elevated LVEDP (29 mmHg).  4. Normal LV ejection fraction (60-65%).    RECOMMENDATIONS:  DAPT with ASA and clopidogrel for 6 months and then monotherapy with ASA or clopidogrel indefinitely thereafter.  High intensity statin, beta-blocker, ARB ordered  TTE ordered  IV lasix 40mg ordered given elevated LVEDP.  Strict control of hypertension.    Darwin Hunter MD  [place]  1/27/2024  2:01 PM

## 2024-01-27 NOTE — PRE-SEDATION ASSESSMENT
Physician Pre-Sedation Assessment    Pre-Sedation Assessment:    Sedation History: Previous Sedation with No Complications and Airway Assessed    Cardiac: normal S1, S2  Respiratory: breath sounds clear bilaterally   Abdomen: soft, BS (+), non-tender    ASA Classification: 3. Patient with severe systemic disease    Plan: IV Sedation     
no
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
## 2024-01-27 NOTE — PLAN OF CARE
Rec'd pt from IVS at ~1430, 1 stent placed to LAD. Rt radial access w/ TR band on. Recovered per protocol w/ frequent site checks and vital signs. A&O x 4. Legally blind, white cane at bedside to be used w/ ambulation. Tele shows NSR. O2 sats adequate on RA. Pt continent, up w/ SBA. No C/O pain or SOB, c/o anxiety, prn xanax given. Rt radial site CDI, no hematoma, . Bed locked and in low position, call light and personal items within reach. Will continue to monitor. POC - [facility] recovery, nitroglycerin gtt, US kidneys and echo ordered to be done tomorrow.    Problem: CARDIOVASCULAR - ADULT  Goal: Maintains optimal cardiac output and hemodynamic stability  Description: INTERVENTIONS:  - Monitor vital signs, rhythm, and trends  - Monitor for bleeding, hypotension and signs of decreased cardiac output  - Evaluate effectiveness of vasoactive medications to optimize hemodynamic stability  - Monitor arterial and/or venous puncture sites for bleeding and/or hematoma  - Assess quality of pulses, skin color and temperature  - Assess for signs of decreased coronary artery perfusion - ex. Angina  - Evaluate fluid balance, assess for edema, trend weights  Outcome: Progressing  Goal: Absence of cardiac arrhythmias or at baseline  Description: INTERVENTIONS:  - Continuous cardiac monitoring, monitor vital signs, obtain 12 lead EKG if indicated  - Evaluate effectiveness of antiarrhythmic and heart rate control medications as ordered  - Initiate emergency measures for life threatening arrhythmias  - Monitor electrolytes and administer replacement therapy as ordered  Outcome: Progressing
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
## 2024-01-27 NOTE — PROGRESS NOTES
01/27/24 1433   Clinical Encounter Type   Visited With Patient and family together   Continue Visiting No   Referral From Nurse   Referral To    Pentecostalism Encounters   Pentecostalism Needs Prayer   Family Spiritual Encounters   Family Coping Open/discussion  (about lifestyle choices)   Taxonomy   Intended Effects Aligning care plan with patient's values   Methods Explore spiritual/Presybeterian beliefs;Offer emotional support;Exploring hope;Encourage self reflection;Encourage self care   Interventions [place];Discuss plan of care;Respond as  to a defined crisis event      was called to ED for CA.   provided support and told Salvador he was praying for him.   provided logistical, emotional, and convenience care to his mother, Yuni in the Ed and the Cath Lab waiting room. She expressed concern for his health based on his past health challenges and requirements of his profession, and family overall history.   mentioned that self-care means making good choices today for ourselves (regardless of how we were raised).   acknowledged that old habits are hard to break, but could affect quality of life we now.  Spiritual Care support can be requested via an Epic consult.
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
## 2024-01-28 ENCOUNTER — APPOINTMENT (OUTPATIENT)
Dept: ULTRASOUND IMAGING | Facility: HOSPITAL | Age: 49
End: 2024-01-28
Attending: HOSPITALIST
Payer: COMMERCIAL

## 2024-01-28 ENCOUNTER — APPOINTMENT (OUTPATIENT)
Dept: CV DIAGNOSTICS | Facility: HOSPITAL | Age: 49
End: 2024-01-28
Attending: HOSPITALIST
Payer: COMMERCIAL

## 2024-01-28 VITALS
RESPIRATION RATE: 18 BRPM | DIASTOLIC BLOOD PRESSURE: 80 MMHG | BODY MASS INDEX: 26.52 KG/M2 | SYSTOLIC BLOOD PRESSURE: 140 MMHG | HEART RATE: 58 BPM | WEIGHT: 175 LBS | OXYGEN SATURATION: 98 % | HEIGHT: 68 IN | TEMPERATURE: 98 F

## 2024-01-28 LAB
ANION GAP SERPL CALC-SCNC: 6 MMOL/L (ref 0–18)
ATRIAL RATE: 77 BPM
BUN BLD-MCNC: 9 MG/DL (ref 9–23)
CALCIUM BLD-MCNC: 9.3 MG/DL (ref 8.5–10.1)
CHLORIDE SERPL-SCNC: 110 MMOL/L (ref 98–112)
CHOLEST SERPL-MCNC: 143 MG/DL (ref ?–200)
CO2 SERPL-SCNC: 25 MMOL/L (ref 21–32)
CREAT BLD-MCNC: 0.83 MG/DL
EGFRCR SERPLBLD CKD-EPI 2021: 107 ML/MIN/1.73M2 (ref 60–?)
ERYTHROCYTE [DISTWIDTH] IN BLOOD BY AUTOMATED COUNT: 12.1 %
GLUCOSE BLD-MCNC: 101 MG/DL (ref 70–99)
HCT VFR BLD AUTO: 37.5 %
HDLC SERPL-MCNC: 87 MG/DL (ref 40–59)
HGB BLD-MCNC: 13.7 G/DL
LDLC SERPL CALC-MCNC: 40 MG/DL (ref ?–100)
MCH RBC QN AUTO: 32.7 PG (ref 26–34)
MCHC RBC AUTO-ENTMCNC: 36.5 G/DL (ref 31–37)
MCV RBC AUTO: 89.5 FL
NONHDLC SERPL-MCNC: 56 MG/DL (ref ?–130)
OSMOLALITY SERPL CALC.SUM OF ELEC: 291 MOSM/KG (ref 275–295)
P AXIS: 69 DEGREES
P-R INTERVAL: 162 MS
PLATELET # BLD AUTO: 137 10(3)UL (ref 150–450)
POTASSIUM SERPL-SCNC: 3.6 MMOL/L (ref 3.5–5.1)
POTASSIUM SERPL-SCNC: 3.6 MMOL/L (ref 3.5–5.1)
Q-T INTERVAL: 428 MS
QRS DURATION: 92 MS
QTC CALCULATION (BEZET): 484 MS
R AXIS: -43 DEGREES
RBC # BLD AUTO: 4.19 X10(6)UL
SODIUM SERPL-SCNC: 141 MMOL/L (ref 136–145)
T AXIS: 29 DEGREES
TRIGL SERPL-MCNC: 88 MG/DL (ref 30–149)
VENTRICULAR RATE: 77 BPM
VLDLC SERPL CALC-MCNC: 12 MG/DL (ref 0–30)
WBC # BLD AUTO: 5.6 X10(3) UL (ref 4–11)

## 2024-01-28 PROCEDURE — 93306 TTE W/DOPPLER COMPLETE: CPT | Performed by: HOSPITALIST

## 2024-01-28 PROCEDURE — 99239 HOSP IP/OBS DSCHRG MGMT >30: CPT | Performed by: HOSPITALIST

## 2024-01-28 PROCEDURE — 76775 US EXAM ABDO BACK WALL LIM: CPT | Performed by: HOSPITALIST

## 2024-01-28 PROCEDURE — 93975 VASCULAR STUDY: CPT | Performed by: HOSPITALIST

## 2024-01-28 RX ORDER — ASPIRIN 81 MG/1
81 TABLET ORAL DAILY
Qty: 30 TABLET | Refills: 0 | Status: SHIPPED | OUTPATIENT
Start: 2024-01-28 | End: 2024-02-27

## 2024-01-28 RX ORDER — POTASSIUM CHLORIDE 20 MEQ/1
40 TABLET, EXTENDED RELEASE ORAL EVERY 4 HOURS
Qty: 4 TABLET | Refills: 0 | Status: COMPLETED | OUTPATIENT
Start: 2024-01-28 | End: 2024-01-28

## 2024-01-28 RX ORDER — CLONIDINE HYDROCHLORIDE 0.3 MG/1
0.3 TABLET ORAL 2 TIMES DAILY
COMMUNITY

## 2024-01-28 RX ORDER — ATORVASTATIN CALCIUM 80 MG/1
80 TABLET, FILM COATED ORAL NIGHTLY
Qty: 90 TABLET | Refills: 3 | Status: SHIPPED | OUTPATIENT
Start: 2024-01-28 | End: 2025-01-22

## 2024-01-28 RX ORDER — CLOPIDOGREL BISULFATE 75 MG/1
75 TABLET ORAL DAILY
Qty: 90 TABLET | Refills: 3 | Status: SHIPPED | OUTPATIENT
Start: 2024-01-28 | End: 2025-01-22

## 2024-01-28 NOTE — PLAN OF CARE
Rec'd pt at 0730. A&O x 4, blind. Tele shows NSR/SB. O2 sats adequate on RA. Pt continent, up w/ SBA. No C/O pain or SOB. Rt radial site CDI, soft, bandaid in place. Bed locked and in low position, call light and personal items within reach. Will continue to monitor. POC - Wean off nitroglycerin gtt, US kidneys and echo to be done toay. If both scans (-) and BP stable, will dc home today.    1015 - Nitroglycerin gtt weaned off, most recent /74.    Problem: CARDIOVASCULAR - ADULT  Goal: Maintains optimal cardiac output and hemodynamic stability  Description: INTERVENTIONS:  - Monitor vital signs, rhythm, and trends  - Monitor for bleeding, hypotension and signs of decreased cardiac output  - Evaluate effectiveness of vasoactive medications to optimize hemodynamic stability  - Monitor arterial and/or venous puncture sites for bleeding and/or hematoma  - Assess quality of pulses, skin color and temperature  - Assess for signs of decreased coronary artery perfusion - ex. Angina  - Evaluate fluid balance, assess for edema, trend weights  Outcome: Progressing  Goal: Absence of cardiac arrhythmias or at baseline  Description: INTERVENTIONS:  - Continuous cardiac monitoring, monitor vital signs, obtain 12 lead EKG if indicated  - Evaluate effectiveness of antiarrhythmic and heart rate control medications as ordered  - Initiate emergency measures for life threatening arrhythmias  - Monitor electrolytes and administer replacement therapy as ordered  Outcome: Progressing     Problem: Patient/Family Goals  Goal: Patient/Family Long Term Goal  Description: Patient's Long Term Goal: Back home    Interventions:  - Medication management  - See additional Care Plan goals for specific interventions  Outcome: Progressing  Goal: Patient/Family Short Term Goal  Description: Patient's Short Term Goal: No CP and controlled BP    Interventions:   - Nitroglycerin gtt  - Echo  - US kidney  - See additional Care Plan goals for specific  interventions  Outcome: Progressing

## 2024-01-28 NOTE — PROGRESS NOTES
[address] [address]  ?  01/28/24  ?  Re: Salvador Becker  ?  To Whom It May Concern:    Salvador Becker was admitted to [facility] from 1/27/2024 to 01/28/24.    Please excuse Salvador Becker from attending work for these days.    The patient may return to work on 02/01/2024.  ?  Thank you,    Fahad Avilez, DO  [facility] Hospitalist

## 2024-01-28 NOTE — PROGRESS NOTES
[facility] Cardiology  Progress Note    Salvador Becker Patient Status:  Inpatient    1975 MRN [id number]   [facility] 8NE-A Attending Fahad Avilez,    Hosp Day # 1 PCP Brianda Andres MD     Impression:  1. chest pain, ECG changes--> PCI to distal LAD (24, Arasar)  2. uncontrolled HTN  3. HL  4. legally blind.    Plan:  1. s/p PCI to LAD; symptoms improved.   2. DAPT: asa/clopidogrel, stressed medication compliance.  3. BP: wean ntg gtt as long as BP<160/90, adjust oral meds as needed- carvedilol 12.5mg bid, clonidine 0.3mg bid losartan/hydrochlorothiazide 100/25mg, nifedipine 60mg daily.   4. multi-drug BP regimen: consider outpatient 2ndary HTN work-up, weaning off clonidine longer term.  5. atorvastatin 80mg.  6. hopefully home today, if off NTG.  f/u 2-4 weeks Dr. Ga (DC f/u order placed).      Subjective:  The patient denies any chest pain or dyspnea at this time. Questions answered regarding PCi yesterday.      Objective:  /72 (BP Location: Right arm)   Pulse 58   Temp 98.1 °F (36.7 °C) (Oral)   Resp 18   Ht 68\"   Wt 175 lb (79.4 kg)   SpO2 96%   BMI 26.61 kg/m²   Temp (24hrs), Av.1 °F (36.7 °C), Min:97.6 °F (36.4 °C), Max:98.4 °F (36.9 °C)      Intake/Output Summary (Last 24 hours) at 2024 0655  Last data filed at 2024 0459  Gross per 24 hour   Intake 360 ml   Output 1225 ml   Net -865 ml     Wt Readings from Last 3 Encounters:   24 175 lb (79.4 kg)   23 175 lb 12.8 oz (79.7 kg)   22 162 lb 3.2 oz (73.6 kg)       General: Awake and alert; in no acute distress  Cardiac: Regular rate and regular rhythm; no murmurs/rubs/gallops are appreciated  Lungs: Clear to auscultation bilaterally; no accessory muscle use  Abdomen: Soft, non-tender; bowel sounds are normoactive  Extremities: No clubbing/cyanosis; moves all 4 extremities normally    Current Facility-Administered Medications   Medication Dose Route Frequency    aspirin  chewable tab 324 mg  324 mg Oral Once    carvedilol (Coreg) tab 12.5 mg  12.5 mg Oral BID with meals    cloNIDine (Catapres) tab 0.3 mg  0.3 mg Oral BID    folic acid (Folvite) tab 1 mg  1 mg Oral Nightly    fluticasone propionate (Flonase) 50 MCG/ACT nasal suspension 2 spray  2 spray Each Nare QPM    losartan (Cozaar) 100 mg, hydroCHLOROthiazide (Hydrodiuril) 25 mg for Hyzaar 100/25 ([facility] only)   Oral Nightly    NIFEdipine ER (Procardia-XL) 24 hr tab 60 mg  60 mg Oral Daily    melatonin tab 3 mg  3 mg Oral Nightly PRN    acetaminophen (Tylenol Extra Strength) tab 500 mg  500 mg Oral Q4H PRN    morphINE PF 2 MG/ML injection 1 mg  1 mg Intravenous Q2H PRN    Or    morphINE PF 2 MG/ML injection 2 mg  2 mg Intravenous Q2H PRN    Or    morphINE PF 4 MG/ML injection 4 mg  4 mg Intravenous Q2H PRN    ondansetron (Zofran) 4 MG/2ML injection 4 mg  4 mg Intravenous Q6H PRN    prochlorperazine (Compazine) 10 MG/2ML injection 5 mg  5 mg Intravenous Q8H PRN    polyethylene glycol (PEG 3350) (Miralax) 17 g oral packet 17 g  17 g Oral Daily PRN    sennosides (Senokot) tab 17.2 mg  17.2 mg Oral Nightly PRN    bisacodyl (Dulcolax) 10 MG rectal suppository 10 mg  10 mg Rectal Daily PRN    fleet enema (Fleet) 7-19 GM/118ML rectal enema 133 mL  1 enema Rectal Once PRN    nitroGLYCERIN in dextrose 5% 50 mg/250mL infusion premix  5-400 mcg/min Intravenous Continuous    clopidogrel (Plavix) tab 75 mg  75 mg Oral Daily    aspirin DR tab 81 mg  81 mg Oral Daily    atorvastatin (Lipitor) tab 80 mg  80 mg Oral Nightly    furosemide (Lasix) 10 mg/mL injection 40 mg  40 mg Intravenous Once    ALPRAZolam (Xanax) tab 0.25 mg  0.25 mg Oral TID PRN    influenza vaccine split quad (Fluzone QIV) 0.5 mL IM injection (ages 6 months to 64 years) 0.5 mL  0.5 mL Intramuscular Prior to discharge       Laboratory Data:  Lab Results   Component Value Date    WBC 5.6 01/28/2024    HGB 13.7 01/28/2024    HCT 37.5 01/28/2024    .0 01/28/2024     Lab  Results   Component Value Date    INR 0.89 01/27/2024    INR 1.03 04/08/2018    INR 0.99 04/07/2018     Lab Results   Component Value Date     01/28/2024    K 3.6 01/28/2024    K 3.6 01/28/2024     01/28/2024    CO2 25.0 01/28/2024    BUN 9 01/28/2024    CREATSERUM 0.83 01/28/2024     01/28/2024    CA 9.3 01/28/2024       Telemetry: No malignant tachyarrhythmias or bradyarrhythmias      Thank you for allowing our practice to participate in the care of your patient. Please do not hesitate to contact me if you have any questions.

## 2024-01-28 NOTE — DISCHARGE PLANNING
Explained discharge instructions including medications and follow-up appointments to pt, verbalized understanding. Flu shot given. IV removed. Tele monitor discontinued. Will be transported via wheelchair.

## 2024-01-28 NOTE — PLAN OF CARE
Patient is alert & oriented x4, partially blind. Pt report severe anxiety last night, expressed wanting to go home, obtained order for x1 dose PO ativan w/ relief. Pt ambulates well, SBA. Breath sounds are clear bilateral. On room air. Normal sinus/diann rhythm. No pain reported. R wrist is soft, no hematoma, and radial pulse present. NTG gtt overnight, BP stable. NPO after midnight for US kidney w/ doppler. Bed in low position and call light within reach. Reviewed plan of care and patient verbalizes understanding.        Problem: CARDIOVASCULAR - ADULT  Goal: Maintains optimal cardiac output and hemodynamic stability  Description: INTERVENTIONS:  - Monitor vital signs, rhythm, and trends  - Monitor for bleeding, hypotension and signs of decreased cardiac output  - Evaluate effectiveness of vasoactive medications to optimize hemodynamic stability  - Monitor arterial and/or venous puncture sites for bleeding and/or hematoma  - Assess quality of pulses, skin color and temperature  - Assess for signs of decreased coronary artery perfusion - ex. Angina  - Evaluate fluid balance, assess for edema, trend weights  1/28/2024 0143 by Arie Moreno RN  Outcome: Progressing  1/28/2024 0143 by Arie Moreno RN  Outcome: Progressing  Goal: Absence of cardiac arrhythmias or at baseline  Description: INTERVENTIONS:  - Continuous cardiac monitoring, monitor vital signs, obtain 12 lead EKG if indicated  - Evaluate effectiveness of antiarrhythmic and heart rate control medications as ordered  - Initiate emergency measures for life threatening arrhythmias  - Monitor electrolytes and administer replacement therapy as ordered  1/28/2024 0143 by Arie Moreno, RN  Outcome: Progressing  1/28/2024 0143 by Arie Moreno RN  Outcome: Progressing     Problem: Patient/Family Goals  Goal: Patient/Family Long Term Goal  Description: Patient's Long Term Goal: Back home    Interventions:  - Medication management  - See additional Care Plan goals for  specific interventions  Outcome: Progressing  Goal: Patient/Family Short Term Goal  Description: Patient's Short Term Goal: No CP and controlled BP    Interventions:   - Nitroglycerin gtt  - Echo  - US kidney  - See additional Care Plan goals for specific interventions  Outcome: Progressing

## 2024-01-28 NOTE — DISCHARGE SUMMARY
[facility]     Salvador Becker Patient Status:  Inpatient    1975 MRN [id number]   Location [facility] 8NE-A Attending Fahad Avilez DO   Hosp Day # 1 PCP Brianda Andres MD     Date of Admission: 2024  Date of Discharge:   2024    Discharge Disposition: Home or Self Care    Discharge Diagnosis:  Unstable angina  CAD  Hypertensive emergency  Dyslipidemia    History of Present Illness: Salvador Becker is a 48 year old male with a PMH of CAD and HTN presented to ED due to chest pain that developed this morning. Patient reports sensation that someone is sitting in his chest. Initially discomfort was located in center of chest but has started radiating down left arm. Associated SOB, nausea, and diaphoresis.      Brief Synopsis: Patient admitted with unstable angina and hypertensive emergency. Cardiology consulted. Patient taken emergently to cath lab and underwent cardiac cath with PCI to LAD. Hypertensive emergency treated with nitroglycerin drip initially and transitioned back to PO medications. Medical management optimized. Patient discharged home in stable condition.     Lace+ Score: 53  59-90 High Risk  29-58 Medium Risk  0-28   Low Risk       TCM Follow-Up Recommendation:  LACE 29-58: Moderate Risk of readmission after discharge from the hospital.      Procedures during hospitalization:   Cardiac cath with PCI/PRASHANTH to LAD    Incidental or significant findings and recommendations (brief descriptions):  None    Lab/Test results pending at Discharge:   None    Consultants:  Cardiology    Discharge Medication List:     Discharge Medications        START taking these medications        Instructions Prescription details   ALPRAZolam 0.25 MG Tabs  Commonly known as: Xanax      Take 1 tablet (0.25 mg total) by mouth daily as needed for Anxiety.   Quantity: 10 tablet  Refills: 0     aspirin 81 MG Tbec  Replaces: aspirin 325 MG Tabs      Take 1 tablet (81 mg total) by mouth  daily.   Stop taking on: February 27, 2024  Quantity: 30 tablet  Refills: 0     clopidogrel 75 MG Tabs  Commonly known as: Plavix      Take 1 tablet (75 mg total) by mouth daily.   Quantity: 90 tablet  Refills: 3            CHANGE how you take these medications        Instructions Prescription details   atorvastatin 80 MG Tabs  Commonly known as: Lipitor  What changed:   how much to take  how to take this  when to take this  Another medication with the same name was removed. Continue taking this medication, and follow the directions you see here.      Take 1 tablet (80 mg total) by mouth nightly.   Quantity: 90 tablet  Refills: 3            CONTINUE taking these medications        Instructions Prescription details   albuterol 108 (90 Base) MCG/ACT Aers  Commonly known as: Ventolin HFA      INHALE 1 TO 2 PUFFS BY MOUTH EVERY 4 TO 6 HOURS AS NEEDED FOR WHEEZING FOR SHORTNESS OF BREATH   Quantity: 9 g  Refills: 0     carvedilol 12.5 MG Tabs  Commonly known as: Coreg      Take 1 tablet (12.5 mg total) by mouth 2 (two) times daily with meals.   Refills: 0     cholecalciferol 25 MCG (1000 UT) Tabs  Commonly known as: VITAMIN D3      Take 1 tablet (1,000 Units total) by mouth daily.   Refills: 0     cloNIDine 0.3 MG Tabs  Commonly known as: Catapres      Take 1 tablet (0.3 mg total) by mouth 2 (two) times daily. Per Dr Manpreet Ha (Cards), this is dose pt should be taking.   Refills: 0     EPINEPHrine 0.3 MG/0.3ML Soaj  Commonly known as: EpiPen      Inject 0.3 mL (1 each total) into the muscle as needed (anaphylaxis (shellfish)).   Quantity: 2 each  Refills: 0     fluticasone propionate 50 MCG/ACT Susp  Commonly known as: Flonase      USE 2 SPRAY(S) IN EACH NOSTRIL IN THE EVENING   Quantity: 16 g  Refills: 0     folic acid 1 MG Tabs  Commonly known as: Folvite      Take 1 tablet (1 mg total) by mouth daily.   Quantity: 90 tablet  Refills: 1     losartan-hydroCHLOROthiazide 100-25 MG Tabs  Commonly known as: Hyzaar      Take  1 tablet by mouth daily.   Quantity: 90 tablet  Refills: 0     NIFEdipine ER 60 MG Tb24  Commonly known as: Procardia-XL      Take 1 tablet (60 mg total) by mouth daily.   Refills: 0            STOP taking these medications      aspirin 325 MG Tabs  Replaced by: aspirin 81 MG Tbec        azelastine 137 MCG/SPRAY Soln        isosorbide mononitrate ER 60 MG Tb24  Commonly known as: Imdur                  Where to Get Your Medications        These medications were sent to [facility] [address] [place] [address] [phone], [phone]  [address] [place] [id number]      Phone: [phone]   aspirin 81 MG Tbec  atorvastatin 80 MG Tabs  clopidogrel 75 MG Tabs         [facility] N[facility]A    Follow-up appointment:   No follow-up provider specified.  Appointments for Next 30 Days 2024 - 2024      None            Vital signs:  Temp:  [97.6 °F (36.4 °C)-98.4 °F (36.9 °C)] 98 °F (36.7 °C)  Pulse:  [] 65  Resp:  [14-23] 16  BP: (126-154)/() 126/74  SpO2:  [96 %-100 %] 98 %    Physical Exam:    General: No acute distress   Lungs: clear to auscultation  Cardiovascular: S1, S2  Abdomen: Soft    -----------------------------------------------------------------------------------------------  PATIENT DISCHARGE INSTRUCTIONS: See electronic chart    Fahad Avilez DO    Total time spent on discharge plannin minutes     The  Cures Act makes medical notes like these available to patients in the interest of transparency. Please be advised this is a medical document. Medical documents are intended to carry relevant information, facts as evident, and the clinical opinion of the practitioner. The medical note is intended as peer to peer communication and may appear blunt or direct. It is written in medical language and may contain abbreviations or verbiage that are unfamiliar.

## 2024-01-29 ENCOUNTER — PATIENT OUTREACH (OUTPATIENT)
Dept: CASE MANAGEMENT | Age: 49
End: 2024-01-29

## 2024-01-29 ENCOUNTER — TELEPHONE (OUTPATIENT)
Dept: INTERNAL MEDICINE CLINIC | Facility: CLINIC | Age: 49
End: 2024-01-29

## 2024-01-29 DIAGNOSIS — Z02.9 ENCOUNTERS FOR UNSPECIFIED ADMINISTRATIVE PURPOSE: Primary | ICD-10-CM

## 2024-01-29 DIAGNOSIS — I21.3 ST ELEVATION MYOCARDIAL INFARCTION (STEMI), UNSPECIFIED ARTERY (HCC): ICD-10-CM

## 2024-01-29 NOTE — TELEPHONE ENCOUNTER
Patient under the impression that he was suppose to get an rx for Xanax when discharged from hospital but has not received anything. Wondering if Dr Andres can send in rx.   Advised patient that Dr Andres is out office and will need follow up appointment to discuss medication need further. Scheduled patient for available appt on 01/30/2024 01/12/2023 Dr Andres   4. Panic attacks  He is having episodes 1-2 times a month where he feels congested, then feels short of breath, starts panicking.  Possible panic attacks.  Will start on PRN alprazolam.  May consider daily SSRI/SNRI if symptoms become more frequent or more severe.  - ALPRAZolam 0.25 MG Oral Tab; Take 1 tablet (0.25 mg total) by mouth daily as needed for Anxiety.  Dispense: 10 tablet; Refill: 0

## 2024-01-29 NOTE — TELEPHONE ENCOUNTER
Spoke to pt for TCM today.  Pt does not have an appointment scheduled at this time.  TCM appt recommended by 2/11/24 as pt is a moderate risk for readmission.  Please advise.    BOOK BY DATE (last date for TCM): 02/11/24    Clinical staff:  Please f/u with pt and try to get them to schedule as pt would greatly benefit from TCM appt.  Thank you!    Pt states he was given Xanax during hospital stay and it helped with his anxiety. Pt is requesting prescription sent to his pharmacy on file. Pt indicates during discharge nurse told him it was sent to pharmacy, but pharmacy never received. Please advise. Thank you.

## 2024-01-29 NOTE — PROGRESS NOTES
Hospital follow up.    Meme Lee, ALBINO  Cardiology  [address]   [address]  [place] [address] [phone]  Monday 2/5 @9    Confirmed with patient.    Closing encounter.

## 2024-01-29 NOTE — PROGRESS NOTES
Initial Post Discharge Follow Up   Discharge Date: 1/28/24  Contact Date: 1/29/2024    Consent Verification:  Assessment Completed With: Patient  HIPAA Verified?  Yes    Discharge Dx:   Unstable angina  CAD  Hypertensive emergency  Dyslipidemia    General:   How have you been since your discharge from the hospital? NCM spoke with patient states he has been resting all day today. Pt feels fatigued, little out of breath when moving around, but no chest pain or shortness of breath. Pt denies any fevers, chills, nausea, vomiting, chest pain or any other symptoms.    Do you have any pain since discharge?  No    How well was your pain managed while in the hospital?   On a scale of 1-5   1- Very Poor and 5- Very well   5  When you were leaving the hospital were your discharge instructions reviewed with you? Yes  How well were your discharge instructions explained to you?   On a scale of 1-5   1- Very Poor and 5- Very well   5  Do you have any questions about your discharge instructions?  No  Before leaving the hospital was your diagnoses explained to you? Yes  Do you have any questions about your diagnoses? No  Are you able to perform normal daily activities of living as you have prior to your hospital stay (dressing, bathing, ambulating to the bathroom, etc)? yes  (NCM) Was patient given a different diet per AVS? no      Medications: Reviewed medication list.  Medications are up to date.  Current Outpatient Medications   Medication Sig Dispense Refill    clopidogrel 75 MG Oral Tab Take 1 tablet (75 mg total) by mouth daily. 90 tablet 3    atorvastatin 80 MG Oral Tab Take 1 tablet (80 mg total) by mouth nightly. 90 tablet 3    cloNIDine 0.3 MG Oral Tab Take 1 tablet (0.3 mg total) by mouth 2 (two) times daily. Per Dr Manpreet Ha (Cards), this is dose pt should be taking.      aspirin 81 MG Oral Tab EC Take 1 tablet (81 mg total) by mouth daily. 30 tablet 0    losartan-hydroCHLOROthiazide 100-25 MG Oral Tab Take 1 tablet by  mouth daily. 90 tablet 0    carvedilol 12.5 MG Oral Tab Take 1 tablet (12.5 mg total) by mouth 2 (two) times daily with meals.      ALPRAZolam 0.25 MG Oral Tab Take 1 tablet (0.25 mg total) by mouth daily as needed for Anxiety. 10 tablet 0    NIFEdipine ER 60 MG Oral Tablet 24 Hr Take 1 tablet (60 mg total) by mouth daily.      FLUTICASONE PROPIONATE 50 MCG/ACT Nasal Suspension USE 2 SPRAY(S) IN EACH NOSTRIL IN THE EVENING 16 g 0    cholecalciferol 25 MCG (1000 UT) Oral Tab Take 1 tablet (1,000 Units total) by mouth daily.      EPINEPHrine 0.3 MG/0.3ML Injection Solution Auto-injector Inject 0.3 mL (1 each total) into the muscle as needed (anaphylaxis (shellfish)). 2 each 0    folic acid 1 MG Oral Tab Take 1 tablet (1 mg total) by mouth daily. 90 tablet 1    ALBUTEROL SULFATE  (90 Base) MCG/ACT Inhalation Aero Soln INHALE 1 TO 2 PUFFS BY MOUTH EVERY 4 TO 6 HOURS AS NEEDED FOR WHEEZING FOR SHORTNESS OF BREATH 9 g 0     Were there any changes to your current medication(s) noted on the AVS? Yes  If so, were these medication changes discussed with you prior to leaving the hospital? Yes  If a new medication was prescribed:    Was the new medication's purpose & side effects reviewed? Yes  Do you have any questions about your new medication? No  Did you  your discharge medications when you left the hospital? Yes  Let's go over your medications together to make sure we are not missing anything. Medications Reviewed  Are there any reasons that keep you from taking your medication as prescribed? No  Are you having any concerns with constipation? No  Did patient receive their flu shot (Sept-March)? Yes    Discharge medications reviewed/discussed/and reconciled against outpatient medications with patient.  Any changes or updates to medications sent to PCP.  Patient Acknowledged     Referrals/orders at D/C:  Referrals/orders placed at D/C? no    DME ordered at D/C? No      Discharge orders, AVS reviewed and  discussed with patient. Any changes or updates to orders sent to PCP.  Patient Acknowledged      SDOH:   Transportation Needs: No Transportation Needs (1/27/2024)    Transportation Needs     Lack of Transportation: No     Financial Resource Strain: Low Risk  (1/29/2024)    Financial Resource Strain     Difficulty of Paying Living Expenses: Not hard at all     Med Affordability: No           Diagnosis specifics:   Have you been experiencing any symptoms since you returned home from the hospital?       No  Any shortness of breath?  no  Did you have a procedure (cardiac cath or angiogram) while in the hospital? yes    If yes, is the site healed?    yes      Is the site red or swollen?  no      Any signs of infection?    no  Any bleeding from the site?  no    Have you scheduled an appointment with Cardiac Rehab?  no           Follow up appointments:          TCC  Was TCC ordered: No      PCP (If no TCC appointment)  Does patient already have a PCP appointment scheduled? No  NCM Attempted to schedule PCP office TCM appointment with patient   If no appointment scheduled: Explain: pt will follow up with cardiology first.     Specialist    Does the patient have any other follow up appointment(s) needing to be scheduled? Yes  If yes: NCM reviewed upcoming specialist appointment with patient: Yes  Does the patient need assistance scheduling appointment(s): No    Is there any reason as to why you cannot make your appointment(s)?  No     Needs post D/C:   Now that you are home, are there any needs or concerns you need addressed before your next visit with your PCP?  (DME, meds, questions, etc.): No    Interventions by NCM:   All discharge instructions reviewed with the patient. Reviewed when to call MD vs when to call 911 or go the ED. Educated patient on the importance of taking all meds as prescribed as well as close f/u with PCP/specialists. Pt verbalized understanding and will contact the office with any further questions  or concerns. Patient denies fevers, chills, nausea, vomiting, shortness of breath, chest pain, or any other symptoms at this time.  NCM attempted to schedule HFU, patient declined will call PCP/TCC office directly. NCM sent TE to PCP office re: assistance in scheduling HFU appt. NCM provided contact information for any further questions/concerns. Patient verbalized understanding and agreeable.       Overall Rating:   How would you rate the care you received while in the hospital? good    CCM referral placed:    No    BOOK BY DATE: 02/11/24

## 2024-01-30 ENCOUNTER — TELEPHONE (OUTPATIENT)
Dept: CARDIAC REHAB | Facility: HOSPITAL | Age: 49
End: 2024-01-30

## 2024-01-30 ENCOUNTER — TELEPHONE (OUTPATIENT)
Dept: INTERNAL MEDICINE CLINIC | Facility: CLINIC | Age: 49
End: 2024-01-30

## 2024-01-30 ENCOUNTER — OFFICE VISIT (OUTPATIENT)
Dept: INTERNAL MEDICINE CLINIC | Facility: CLINIC | Age: 49
End: 2024-01-30
Payer: COMMERCIAL

## 2024-01-30 VITALS
OXYGEN SATURATION: 99 % | HEIGHT: 67 IN | BODY MASS INDEX: 26.53 KG/M2 | SYSTOLIC BLOOD PRESSURE: 116 MMHG | HEART RATE: 55 BPM | DIASTOLIC BLOOD PRESSURE: 80 MMHG | WEIGHT: 169 LBS | TEMPERATURE: 97 F | RESPIRATION RATE: 12 BRPM

## 2024-01-30 DIAGNOSIS — I10 PRIMARY HYPERTENSION: Chronic | ICD-10-CM

## 2024-01-30 DIAGNOSIS — E78.00 HYPERCHOLESTEROLEMIA: Chronic | ICD-10-CM

## 2024-01-30 DIAGNOSIS — I25.110 CORONARY ARTERY DISEASE INVOLVING NATIVE CORONARY ARTERY OF NATIVE HEART WITH UNSTABLE ANGINA PECTORIS (HCC): Primary | Chronic | ICD-10-CM

## 2024-01-30 DIAGNOSIS — Z91.013 SHELLFISH ALLERGY: ICD-10-CM

## 2024-01-30 DIAGNOSIS — F41.0 PANIC ATTACKS: ICD-10-CM

## 2024-01-30 DIAGNOSIS — E53.8 FOLIC ACID DEFICIENCY: Primary | ICD-10-CM

## 2024-01-30 PROBLEM — I21.3 ST ELEVATION MYOCARDIAL INFARCTION (STEMI), UNSPECIFIED ARTERY (HCC): Status: RESOLVED | Noted: 2024-01-27 | Resolved: 2024-01-30

## 2024-01-30 PROBLEM — I16.1 HYPERTENSIVE EMERGENCY: Status: RESOLVED | Noted: 2018-03-20 | Resolved: 2024-01-30

## 2024-01-30 LAB
ISTAT ACTIVATED CLOTTING TIME: 455 SECONDS (ref 74–137)
ISTAT ACTIVATED CLOTTING TIME: 488 SECONDS (ref 74–137)

## 2024-01-30 PROCEDURE — 3008F BODY MASS INDEX DOCD: CPT | Performed by: INTERNAL MEDICINE

## 2024-01-30 PROCEDURE — 99495 TRANSJ CARE MGMT MOD F2F 14D: CPT | Performed by: INTERNAL MEDICINE

## 2024-01-30 PROCEDURE — 3079F DIAST BP 80-89 MM HG: CPT | Performed by: INTERNAL MEDICINE

## 2024-01-30 PROCEDURE — 3074F SYST BP LT 130 MM HG: CPT | Performed by: INTERNAL MEDICINE

## 2024-01-30 RX ORDER — ALPRAZOLAM 0.25 MG/1
0.25 TABLET ORAL DAILY PRN
Qty: 30 TABLET | Refills: 0 | Status: SHIPPED | OUTPATIENT
Start: 2024-01-30

## 2024-01-30 RX ORDER — EPINEPHRINE 0.3 MG/.3ML
0.3 INJECTION SUBCUTANEOUS AS NEEDED
Qty: 2 EACH | Refills: 0 | Status: SHIPPED | OUTPATIENT
Start: 2024-01-30

## 2024-01-30 NOTE — PATIENT INSTRUCTIONS
- Continue current medications  - Follow up with Cardiology clinic as scheduled  - Epipen refilled  - Xanax/alprazolam refilled.  Take as needed.  Try and take sparingly/only for acute panic attacks.  - Follow up in 6 months (August) for blood pressure/chronic issues; follow up earlier as needed.    It was a pleasure seeing you in the clinic today.  Thank you for choosing the [facility] office for your healthcare needs. Please call at [phone] with any questions or concerns.    Brianda Andres MD

## 2024-01-30 NOTE — PROGRESS NOTES
Pt was in  hospital over weekend sat afternoon 1/27/24 and dc 1/28/24, and was not seen by cardiac rehab as we dont see patients on Sundays. Contacted pt  via phone reviewed signs/sx to f/u with MD, reviewed appropriate home walking program. Mailing contact info for phase 2 cardiac rehab for [facility] which is closer to his home. Mailed stent card to patient.     [facility] cardiac rehab contact  [phone].

## 2024-01-30 NOTE — PAYOR COMM NOTE
--------------  DISCHARGE REVIEW    Payor: [employer]  Subscriber #:  [id number]  Authorization Number: [id number]    Admit date: 24  Admit time:   2:20 PM  Discharge Date: 2024  5:00 PM           [facility]     Salvador Becker Patient Status:  Inpatient    1975 MRN [id number]   Location [facility] 8NE-A Attending Fahad Avilez,    Hosp Day # 1 PCP Brianda Andres MD     Date of Admission: 2024  Date of Discharge:   2024    Discharge Disposition: Home or Self Care    Discharge Diagnosis:  Unstable angina  CAD  Hypertensive emergency  Dyslipidemia    History of Present Illness: Salvador Becker is a 48 year old male with a PMH of CAD and HTN presented to ED due to chest pain that developed this morning. Patient reports sensation that someone is sitting in his chest. Initially discomfort was located in center of chest but has started radiating down left arm. Associated SOB, nausea, and diaphoresis.      Brief Synopsis: Patient admitted with unstable angina and hypertensive emergency. Cardiology consulted. Patient taken emergently to cath lab and underwent cardiac cath with PCI to LAD. Hypertensive emergency treated with nitroglycerin drip initially and transitioned back to PO medications. Medical management optimized. Patient discharged home in stable condition.     Lace+ Score: 53  59-90 High Risk  29-58 Medium Risk  0-28   Low Risk       TCM Follow-Up Recommendation:  LACE 29-58: Moderate Risk of readmission after discharge from the hospital.      Procedures during hospitalization:   Cardiac cath with PCI/PRASHANTH to LAD    Incidental or significant findings and recommendations (brief descriptions):  None    Lab/Test results pending at Discharge:   None    Consultants:  Cardiology    Discharge Medication List:     Discharge Medications        START taking these medications        Instructions Prescription details   ALPRAZolam 0.25 MG Tabs  Commonly known as:  Xanax      Take 1 tablet (0.25 mg total) by mouth daily as needed for Anxiety.   Quantity: 10 tablet  Refills: 0     aspirin 81 MG Tbec  Replaces: aspirin 325 MG Tabs      Take 1 tablet (81 mg total) by mouth daily.   Stop taking on: February 27, 2024  Quantity: 30 tablet  Refills: 0     clopidogrel 75 MG Tabs  Commonly known as: Plavix      Take 1 tablet (75 mg total) by mouth daily.   Quantity: 90 tablet  Refills: 3            CHANGE how you take these medications        Instructions Prescription details   atorvastatin 80 MG Tabs  Commonly known as: Lipitor  What changed:   how much to take  how to take this  when to take this  Another medication with the same name was removed. Continue taking this medication, and follow the directions you see here.      Take 1 tablet (80 mg total) by mouth nightly.   Quantity: 90 tablet  Refills: 3            CONTINUE taking these medications        Instructions Prescription details   albuterol 108 (90 Base) MCG/ACT Aers  Commonly known as: Ventolin HFA      INHALE 1 TO 2 PUFFS BY MOUTH EVERY 4 TO 6 HOURS AS NEEDED FOR WHEEZING FOR SHORTNESS OF BREATH   Quantity: 9 g  Refills: 0     carvedilol 12.5 MG Tabs  Commonly known as: Coreg      Take 1 tablet (12.5 mg total) by mouth 2 (two) times daily with meals.   Refills: 0     cholecalciferol 25 MCG (1000 UT) Tabs  Commonly known as: VITAMIN D3      Take 1 tablet (1,000 Units total) by mouth daily.   Refills: 0     cloNIDine 0.3 MG Tabs  Commonly known as: Catapres      Take 1 tablet (0.3 mg total) by mouth 2 (two) times daily. Per Dr Manpreet Ha (Cards), this is dose pt should be taking.   Refills: 0     EPINEPHrine 0.3 MG/0.3ML Soaj  Commonly known as: EpiPen      Inject 0.3 mL (1 each total) into the muscle as needed (anaphylaxis (shellfish)).   Quantity: 2 each  Refills: 0     fluticasone propionate 50 MCG/ACT Susp  Commonly known as: Flonase      USE 2 SPRAY(S) IN EACH NOSTRIL IN THE EVENING   Quantity: 16 g  Refills: 0     folic  acid 1 MG Tabs  Commonly known as: Folvite      Take 1 tablet (1 mg total) by mouth daily.   Quantity: 90 tablet  Refills: 1     losartan-hydroCHLOROthiazide 100-25 MG Tabs  Commonly known as: Hyzaar      Take 1 tablet by mouth daily.   Quantity: 90 tablet  Refills: 0     NIFEdipine ER 60 MG Tb24  Commonly known as: Procardia-XL      Take 1 tablet (60 mg total) by mouth daily.   Refills: 0            STOP taking these medications      aspirin 325 MG Tabs  Replaced by: aspirin 81 MG Tbec        azelastine 137 MCG/SPRAY Soln        isosorbide mononitrate ER 60 MG Tb24  Commonly known as: Imdur                  Where to Get Your Medications        These medications were sent to [facility] [address] [place] [address] [phone], [phone]  [address] [place] [id number]      Phone: [phone]   aspirin 81 MG Tbec  atorvastatin 80 MG Tabs  clopidogrel 75 MG Tabs         ILPM reviewed: N/A    Follow-up appointment:   No follow-up provider specified.  Appointments for Next 30 Days 2024 - 2024      None            Vital signs:  Temp:  [97.6 °F (36.4 °C)-98.4 °F (36.9 °C)] 98 °F (36.7 °C)  Pulse:  [] 65  Resp:  [14-23] 16  BP: (126-154)/() 126/74  SpO2:  [96 %-100 %] 98 %    Physical Exam:    General: No acute distress   Lungs: clear to auscultation  Cardiovascular: S1, S2  Abdomen: Soft    -----------------------------------------------------------------------------------------------  PATIENT DISCHARGE INSTRUCTIONS: See electronic chart    Fahad Avilez DO    Total time spent on discharge plannin minutes     The  Cures Act makes medical notes like these available to patients in the interest of transparency. Please be advised this is a medical document. Medical documents are intended to carry relevant information, facts as evident, and the clinical opinion of the practitioner. The medical note is intended as peer to peer communication and may appear blunt  or direct. It is written in medical language and may contain abbreviations or verbiage that are unfamiliar.       Electronically signed by Fahad Avilez DO on 1/28/2024 12:53 PM         REVIEWER COMMENTS

## 2024-01-30 NOTE — PROGRESS NOTES
Subjective:   Salvador Becker is a 48 year old male who presents for hospital follow up.   He was discharged from [facility] to Home or Self Care  Admission Date: 1/27/24   Discharge Date: 1/28/24  Hospital Discharge Diagnosis: Unstable angina  CAD  Hypertensive emergency  Dyslipidemia    Interactive contact within 2 business days post discharge first initiated on Date: 1/29/2024  During the visit, the following was completed:  Obtained and reviewed discharge summary, continuity of care documents, and Hospitalist notes  Reviewed Labs (CBC, CMP)  HPI:  Patient presented to [facility] emergency department on 1/27/24 with acute chest pain.  Had very high blood pressure reading - hypertensive emergency range.  EKG with inverted inferior T-waves.  He was admitted to the hospital. Seen by Cardiology.  Had cath which showed severe LAD disease, elevated LVEDP.  Had stent placed.  Medications adjusted.  Has follow up office visit with Cardiology next week.  Blood pressures much better now.    Since his hospitalization - has been having more panic attacks.  Feels rushing sensation in chest, cannot breath, hard to breathe out of the nose.  Occurred during a Zoom meeting today.  Occurred last night when heating something up in the microwave. Not related to activity/exertion.    Other chronic issues:  Shellfish allergy - needs refill on Epipens.    History/Other:   Current Medications:  Medication Reconciliation:  I am aware of an inpatient discharge within the last 30 days.  The discharge medication list has been reconciled with the patient's current medication list and reviewed by me.  See medication list for additions of new medication, and changes to current doses of medications and discontinued medications.  Outpatient Medications Marked as Taking for the 1/30/24 encounter (Office Visit) with Brianda Andres MD   Medication Sig    EPINEPHrine 0.3 MG/0.3ML Injection Solution Auto-injector Inject 0.3 mL (1 each total)  into the muscle as needed (anaphylaxis (shellfish)).    ALPRAZolam 0.25 MG Oral Tab Take 1 tablet (0.25 mg total) by mouth daily as needed for Anxiety.    clopidogrel 75 MG Oral Tab Take 1 tablet (75 mg total) by mouth daily.    atorvastatin 80 MG Oral Tab Take 1 tablet (80 mg total) by mouth nightly.    cloNIDine 0.3 MG Oral Tab Take 1 tablet (0.3 mg total) by mouth 2 (two) times daily. Per Dr Manpreet Ha (Cards), this is dose pt should be taking.    aspirin 81 MG Oral Tab EC Take 1 tablet (81 mg total) by mouth daily.    losartan-hydroCHLOROthiazide 100-25 MG Oral Tab Take 1 tablet by mouth daily.    carvedilol 12.5 MG Oral Tab Take 1 tablet (12.5 mg total) by mouth 2 (two) times daily with meals.    cholecalciferol 25 MCG (1000 UT) Oral Tab Take 1 tablet (1,000 Units total) by mouth daily.    folic acid 1 MG Oral Tab Take 1 tablet (1 mg total) by mouth daily.       Review of Systems:  GENERAL: fatigue/tiredness; weight stable, no sweating  SKIN: denies any unusual skin lesions  EYES: legally blind  HEENT: denies nasal congestion, sinus pain or ST  LUNGS: denies shortness of breath with exertion  CARDIOVASCULAR: denies chest pain on exertion or palpitations  GI: denies abdominal pain, denies heartburn, denies diarrhea  MUSCULOSKELETAL: denies pain, normal range of motion of extremities  NEURO: denies headaches, denies dizziness, denies weakness  PSYCHE: anxiety/panic attacks  HEMATOLOGIC: denies hx of anemia, or bruising, denies bleeding  ENDOCRINE: denies thyroid history  ALL/ASTHMA: denies hx of allergy or asthma    Objective:   No LMP for male patient.  Estimated body mass index is 26.47 kg/m² as calculated from the following:    Height as of this encounter: 5' 7\" (1.702 m).    Weight as of this encounter: 169 lb (76.7 kg).   /80 (BP Location: Left arm, Patient Position: Sitting, Cuff Size: adult)   Pulse 55   Temp 97.2 °F (36.2 °C) (Temporal)   Resp 12   Ht 5' 7\" (1.702 m)   Wt 169 lb (76.7 kg)    SpO2 99%   BMI 26.47 kg/m²    GENERAL: well developed, well nourished, in no apparent distress  SKIN: no rashes, no suspicious lesions  HEENT: atraumatic, normocephalic, ears and throat are clear  EYES: PERRLA, EOMI, conjunctiva are clear  NECK: supple, no adenopathy, no bruits  CHEST: no chest tenderness  LUNGS: clear to auscultation  CARDIO: RRR without murmur  GI: good BS's, no masses, HSM or tenderness  MUSCULOSKELETAL: back is not tender, FROM of the extremities  EXTREMITIES: no cyanosis, clubbing or edema  NEURO: Oriented times three, cranial nerves are intact, motor and sensory are grossly intact    Assessment & Plan:   1. Coronary artery disease involving native coronary artery of native heart with unstable angina pectoris (HCC)  2. Primary hypertension  3. Hypercholesterolemia  Patient admitted to hospital with hypertensive emergency, acute chest pain.  Had cath done that showed severe LAD stenosis. Stent placed.  Blood pressure much better now.  Does feel a little tired/fatigued after taking medications - suspect his blood pressure has been very high for a while (had insurance issues last year) so will take a while for him to get used to normotensive blood pressures.  No orthostatic symptoms.  Continue carvedilol 12.5 mg BID, clonidine 0.3 mg BID, losartan-HCTZ 100-25 mg every day.  Has been prescribed isosorbide, nifedipine on the past but not on these medications currently.  He had two 80 mg atorvastatin bottles so was taking 160 mg qhs atorvastatin since discharge - we  his atorvastatin bottles today in office so he will be taking the 80 mg qhs dose moving forward.      4. Panic attacks  Increasing anxiety/panic attacks since his hospitalization.  Alprazolam refilled.  Does not sound like anginal symptoms - occurring randomly, once during work Zoom call, another time while microwaving dinner.  May consider SSRI/SNRI if symptoms persist.  - ALPRAZolam 0.25 MG Oral Tab; Take 1 tablet (0.25 mg  total) by mouth daily as needed for Anxiety.  Dispense: 30 tablet; Refill: 0    5. Shellfish allergy  Epipens refilled.  - EPINEPHrine 0.3 MG/0.3ML Injection Solution Auto-injector; Inject 0.3 mL (1 each total) into the muscle as needed (anaphylaxis (shellfish)).  Dispense: 2 each; Refill: 0      Return in about 6 months (around 7/30/2024).
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
## 2024-01-30 NOTE — TELEPHONE ENCOUNTER
Pt was seen today an forgot to ask his pcp if he still needed to be on folic acid 1 MG and if so can he sent to his pharmacy.    [facility] [address] [place] [address] [phone], [phone]

## 2024-01-31 RX ORDER — FOLIC ACID 1 MG/1
1 TABLET ORAL DAILY
Qty: 90 TABLET | Refills: 1 | Status: SHIPPED | OUTPATIENT
Start: 2024-01-31

## 2024-02-04 LAB
ALDOST/RENIN RATIO: 2.4
ALDOSTERONE: 2.4 NG/DL
RENIN ACTIVITY: 0.99 NG/ML/HR

## 2024-03-05 ENCOUNTER — HOSPITAL ENCOUNTER (OUTPATIENT)
Dept: CV DIAGNOSTICS | Age: 49
Discharge: HOME | End: 2024-03-05
Attending: INTERNAL MEDICINE
Payer: COMMERCIAL

## 2024-03-05 DIAGNOSIS — E78.00 HYPERCHOLESTEROLEMIA: ICD-10-CM

## 2024-03-05 DIAGNOSIS — I10 ESSENTIAL HYPERTENSION: ICD-10-CM

## 2024-03-05 DIAGNOSIS — R07.2 PRECORDIAL PAIN: ICD-10-CM

## 2024-03-05 PROCEDURE — 93017 CV STRESS TEST TRACING ONLY: CPT | Performed by: INTERNAL MEDICINE

## 2024-03-05 PROCEDURE — 78452 HT MUSCLE IMAGE SPECT MULT: CPT | Performed by: INTERNAL MEDICINE

## 2024-03-05 RX ORDER — REGADENOSON 0.08 MG/ML
INJECTION, SOLUTION INTRAVENOUS
Status: COMPLETED
Start: 2024-03-05 | End: 2024-03-05

## 2024-03-05 RX ADMIN — REGADENOSON 0.4 MG: 0.08 INJECTION, SOLUTION INTRAVENOUS at 13:45:00

## 2024-03-18 DIAGNOSIS — I10 PRIMARY HYPERTENSION: ICD-10-CM

## 2024-03-19 RX ORDER — LOSARTAN POTASSIUM AND HYDROCHLOROTHIAZIDE 25; 100 MG/1; MG/1
1 TABLET ORAL DAILY
Qty: 90 TABLET | Refills: 2 | Status: SHIPPED | OUTPATIENT
Start: 2024-03-19

## 2024-03-19 NOTE — TELEPHONE ENCOUNTER
PROTOCOL PASSED   Name from pharmacy: Losartan Potassium-HCTZ 100-25 MG Oral Tablet         Will file in chart as: LOSARTAN-HYDROCHLOROTHIAZIDE 100-25 MG Oral Tab    Sig: Take 1 tablet by mouth once daily    Disp: 90 tablet    Refills: 0    Start: 3/18/2024    Class: Normal    Non-formulary For: Primary hypertension    Last ordered: 6 months ago (9/6/2023) by Brianda Andres MD    Last refill: 9/6/2023    Rx #: [id number]    Hypertension Medications Protocol [id number]/18/2024 10:15 AM   Protocol Details CMP or BMP in past 12 months    Last BP reading less than 140/90    In person appointment or virtual visit in the past 12 mos or appointment in next 3 mos    EGFRCR or GFRNAA > 50      To be filled at: [address] [phone], [phone]     LOV: 01/30/24 w/ GORDO   RTC: 07/30/24   FOV: 07/30/24

## 2024-07-21 ENCOUNTER — HOSPITAL ENCOUNTER (EMERGENCY)
Age: 49
Discharge: HOME | End: 2024-07-21
Payer: COMMERCIAL

## 2024-07-21 VITALS
HEART RATE: 57 BPM | TEMPERATURE: 99 F | OXYGEN SATURATION: 96 % | DIASTOLIC BLOOD PRESSURE: 99 MMHG | RESPIRATION RATE: 18 BRPM | SYSTOLIC BLOOD PRESSURE: 161 MMHG | WEIGHT: 170 LBS | BODY MASS INDEX: 25.76 KG/M2 | HEIGHT: 68 IN

## 2024-07-21 DIAGNOSIS — S01.21XA LACERATION OF NOSE, INITIAL ENCOUNTER: Primary | ICD-10-CM

## 2024-07-21 PROCEDURE — 99283 EMERGENCY DEPT VISIT LOW MDM: CPT

## 2024-07-21 PROCEDURE — 12011 RPR F/E/E/N/L/M 2.5 CM/<: CPT

## 2024-07-21 PROCEDURE — 90471 IMMUNIZATION ADMIN: CPT

## 2024-07-22 DIAGNOSIS — E53.8 FOLIC ACID DEFICIENCY: ICD-10-CM
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
## 2024-07-23 RX ORDER — FOLIC ACID 1 MG/1
1 TABLET ORAL DAILY
Qty: 90 TABLET | Refills: 1 | Status: SHIPPED | OUTPATIENT
Start: 2024-07-23

## (undated) DIAGNOSIS — I10 ESSENTIAL HYPERTENSION: ICD-10-CM

## (undated) DEVICE — GLOVE SURG SENSICARE SZ 7-1/2

## (undated) DEVICE — REMOVER DURAPREP 3M

## (undated) DEVICE — PAIN TRAY: Brand: MEDLINE INDUSTRIES, INC.

## (undated) DEVICE — BANDAID COVERLET 1X3

## (undated) DEVICE — GLOVE SURG SENSICARE SZ 7

## (undated) DEVICE — NEEDLE SPINAL 22X5 405148

## (undated) DEVICE — MARKER SKIN 2 TIP

## (undated) DEVICE — EXTENSION SET, MALE LUER LOCK ADAPTER

## (undated) NOTE — LETTER
21      Madai Becker  :  1975      To Whom It May Concern: This patient was seen in our office on 21. He is going to have Xrays of the left hip, and I will recommend treatment after I have seen the imaging.  He should be excused fr

## (undated) NOTE — LETTER
20          Radha Query Eugenio  :  1975      To Whom It May Concern: This patient was seen in our office on 20. We are planning a procedure for him.   The day of the procedure he should be excused from work, once that is scheduled, and

## (undated) NOTE — LETTER
Date & Time: 3/20/2018, 3:27 PM  Patient: Breanne Barbour  Attending Provider:    Sincerely,    Zoey Callahan MD           This certifies that Alex Gomez, a patient at an [facility], am leaving the facility voluntarily

## (undated) NOTE — LETTER
20    RE: Faustine Buerger    : 1975    Dear Dr. Bianka Temple    Your patient is being scheduled for a pain management procedure at [facility] within the next 4 weeks.     Procedure: Left Facet joint injection L4-5, L5-S1  Physician: Dr.Henry Yousif

## (undated) NOTE — LETTER
[facility], [facility]   Date:   8/20/2021     Name:   Demar Power    YOB: 1975   MRN:   [id number]       WHERE IS YOUR PAIN NOW? [place] the areas on your body where you feel the described sensations.   Use th

## (undated) NOTE — MR AVS SNAPSHOT
[facility]  [address]  [phone]               Thank you for choosing us for your health care visit with Brittney Sandra NP. We are glad to serve you and happy to provide you with this summary of your visit.   Ple the common cold. This is because congestion can block the internal passage (eustachian tube) that drains fluid from the middle ear. When the middle ear fills with fluid, bacteria can grow there and cause an infection.  Oral antibiotics are used to treat Boneta Husky after water gets trapped in the ear canal (from swimming or bathing). It can also occur after cleaning too deeply in the ear canal with a cotton swab or other object. Sometimes, hair care products get into the ear canal and cause this problem.   Symptoms ca · Painful or stiff neck  · Drowsiness or confusion  · Fever of 100.4ºF (38ºC) or higher, or as directed by your health care provider  · Seizure  Date Last Reviewed: 3/22/2015  © [id number] [address], [address] [place] [address] For medical emergencies, dial 911. Educational Information     Healthy Diet and Regular Exercise  [employer] for making healthy food choices  -   Enjoy your food, but eat less. Fully enjoy your food when eating.    Don’t

## (undated) NOTE — LETTER
03/04/20        Carmita Kohler [place] [phone]      Dear Joaquina Cohen records indicate that you have outstanding lab work and or testing that was ordered for you and has not yet been completed: Fasting lab work - Make sure y

## (undated) NOTE — LETTER
Date: 2021    Patient Name: Dipak Pal    : 1975        To Whom it may concern:     This letter has been written at the patient's request. Dipak Pal was seen at the [address] [place] [address] pain management clinic for the treatment of a me

## (undated) NOTE — Clinical Note
Please get patient the information for [facility] in [place] so that he can schedule his MRI. Order has been placed into the system. Thanks. Dr. Marlee Parr

## (undated) NOTE — LETTER
ASTHMA ACTION PLAN for Gato Fass     : 1975     Date: 2020  Provider:  Roldan Garcia MD  Phone for doctor or clinic: [address], [address] [id number] [phone]    [facility]

## (undated) NOTE — Clinical Note
Please schedule pt for 3-month follow up with me in the office for hypertension. Thx. Dr. Mitchell Dunbar

## (undated) NOTE — LETTER
07/05/19        Dudley Kelayres Eugenio  [place] [phone]      Dear Carmen Lora records indicate that you have outstanding lab work and or testing that was ordered for you and has not yet been completed:  - Get blood work done (zena

## (undated) NOTE — ED AVS SNAPSHOT
Jelly Montague   MRN: [id number]    Department:  [facility] Emergency Department   Date of Visit:  3/20/2018           Disclosure     Insurance plans vary and the physician(s) referred by the ER may not be covered by your plan.  Please contact you tell this physician (or your personal doctor if your instructions are to return to your personal doctor) about any new or lasting problems. The primary care or specialist physician will see patients referred from the [facility] Emergency Department.  Arthor Bernheim

## (undated) NOTE — LETTER
21  RE: Shruthi Leons    : 1975    Dear Dr. Marybeth Ramirez    Your patient is being scheduled for a pain management procedure at [facility] within the next 4 weeks.     Procedure:  BILATERAL LUMBAR 4 - LUMBAR 5, LUMBAR 5- SACRAL 1 FACET JOINT IN

## (undated) NOTE — Clinical Note
Dear Dr. Lourdes Ernst,       Thank you for referring Miranda Copeland to the [facility]. He was advised to follow up regarding HTN and ear infection. He is having insurance issues so he hasn't been in to see you. Thanks!   Sincerely,  Perez Dos Santos, RYNEP

## (undated) NOTE — LETTER
ASTHMA ACTION PLAN for Wendy Human     : 1975     Date: 2019  Provider:  Timbo Gresham MD  Phone for doctor or clinic: [facility], [address] [facility], [address]PHANIE[address]  [address], [facility]  Sandra Stoddard [phone]    ACT

## (undated) NOTE — LETTER
09/24/20        Madi [address]ler [phone]      Dear Jasmin Sep records indicate that you have outstanding lab work and or testing that was ordered for you and has not yet been completed: Fasting lab work - (at least 1

## (undated) NOTE — LETTER
ASTHMA ACTION PLAN for Dipak Pal     : 1975     Date: 2021  Provider:  Paula Devi MD  Phone for doctor or clinic: [address] [place] [address], [facility], [address],[place] [id number] [phone]    [facility]

## (undated) NOTE — LETTER
07/31/20        Kayy Kohler [place] [id number]      Dear Eliseo Orosco records indicate that you have outstanding lab work and or testing that was ordered for you and has not yet been completed: Fasting lab work - (at least 1

## (undated) NOTE — LETTER
ASTHMA ACTION PLAN for Alfonzo Foster     : 1975     Date: 2018  Provider:  Zara Kumar MD  Phone for doctor or clinic: [facility], [address] [place] [address], STEPHANIE/ [address]   [place] [address]  [address] [place] [id number] [phone]    [facility]